# Patient Record
Sex: FEMALE | Race: WHITE | Employment: FULL TIME | URBAN - METROPOLITAN AREA
[De-identification: names, ages, dates, MRNs, and addresses within clinical notes are randomized per-mention and may not be internally consistent; named-entity substitution may affect disease eponyms.]

---

## 2017-01-19 ENCOUNTER — TELEPHONE (OUTPATIENT)
Dept: ORTHOPEDICS | Facility: CLINIC | Age: 57
End: 2017-01-19

## 2017-01-19 DIAGNOSIS — M75.42 IMPINGEMENT SYNDROME OF LEFT SHOULDER: Primary | ICD-10-CM

## 2017-01-19 DIAGNOSIS — M19.90 SENILE ARTHRITIS: ICD-10-CM

## 2017-01-19 NOTE — TELEPHONE ENCOUNTER
Scheduled surgery for Left shoulder scope, decompression, distal clavicle resection, poss rotator cuff repair on 2/14/2017 with Dr. Miller @ [facility] @ 3:30.  Surgery education packet provided to patient.

## 2017-01-19 NOTE — TELEPHONE ENCOUNTER
Panel Management Review      Patient has the following on her problem list:     Depression / Dysthymia review  PHQ-9 SCORE 3/26/2014 11/19/2014 3/28/2016   Total Score 3 5 -   Total Score - - 6      Patient is due for:  PHQ9    Diabetes    ASA: Passed    Last A1C  A1C      8.3   3/28/2016  A1C      7.5   9/2/2015  A1C      6.6   8/20/2013  A1C      8.8   6/25/2013  A1C tested: Failed    Last LDL:    CHOL      197   3/28/2016  HDL       39   3/28/2016  LDL       94   3/28/2016  TRIG      322   3/28/2016  CHOLHDLRATIO      5.4   6/25/2013  NHDL      158   3/28/2016    Is the patient on a Statin?NO           Is the patient on Aspirin? YES    Medications     Salicylates    aspirin 81 MG tablet          Last three blood pressure readings:  BP Readings from Last 3 Encounters:   09/28/16 142/80   09/26/16 138/80   09/12/16 120/80       Date of last diabetes office visit: 3/28/16     Tobacco History:     History   Smoking status     Former Smoker -- 0.50 packs/day     Types: Cigarettes     Quit date: 06/13/2013   Smokeless tobacco     Never Used     Comment: quit 6/13/13           Hypertension   Last three blood pressure readings:  BP Readings from Last 3 Encounters:   09/28/16 142/80   09/26/16 138/80   09/12/16 120/80     Blood pressure: Failed    HTN Guidelines:  Age 18-59 BP range:  Less than 140/90  Age 60-85 with Diabetes:  Less than 140/90  Age 60-85 without Diabetes:  less than 150/90      Composite cancer screening  Chart review shows that this patient is due/due soon for the following Mammogram and Fecal Colorectal (FIT)  Summary:    Patient is due/failing the following:   A1C, FIT and PHQ9    Action needed:   Patient needs referral/order: Mammo, FIT     Type of outreach:    Phone, spoke to patient.  [facility]    Questions for provider review:    None                                                                                                                                    Kendra Puckett  MALDONADO Estrada

## 2017-01-19 NOTE — TELEPHONE ENCOUNTER
Patient order pending for 2/14/2017  Left shoulder Arthroscopy, decompression, distal clavicle resection, poss rotator cuff tear.  Please sign.

## 2017-01-25 ENCOUNTER — OFFICE VISIT (OUTPATIENT)
Dept: FAMILY MEDICINE | Facility: CLINIC | Age: 57
End: 2017-01-25
Payer: COMMERCIAL

## 2017-01-25 VITALS
SYSTOLIC BLOOD PRESSURE: 154 MMHG | RESPIRATION RATE: 12 BRPM | BODY MASS INDEX: 43.65 KG/M2 | HEART RATE: 97 BPM | TEMPERATURE: 98 F | DIASTOLIC BLOOD PRESSURE: 80 MMHG | WEIGHT: 262.3 LBS

## 2017-01-25 DIAGNOSIS — Z11.59 NEED FOR HEPATITIS C SCREENING TEST: ICD-10-CM

## 2017-01-25 DIAGNOSIS — Z23 NEED FOR PROPHYLACTIC VACCINATION AGAINST STREPTOCOCCUS PNEUMONIAE (PNEUMOCOCCUS): ICD-10-CM

## 2017-01-25 DIAGNOSIS — Z01.818 PREOP GENERAL PHYSICAL EXAM: ICD-10-CM

## 2017-01-25 DIAGNOSIS — Z13.89 SCREENING FOR DIABETIC PERIPHERAL NEUROPATHY: ICD-10-CM

## 2017-01-25 DIAGNOSIS — Z12.11 SCREEN FOR COLON CANCER: Primary | ICD-10-CM

## 2017-01-25 DIAGNOSIS — Z23 NEED FOR PROPHYLACTIC VACCINATION AND INOCULATION AGAINST INFLUENZA: ICD-10-CM

## 2017-01-25 DIAGNOSIS — Z12.31 VISIT FOR SCREENING MAMMOGRAM: ICD-10-CM

## 2017-01-25 LAB
HBA1C MFR BLD: 6.2 % (ref 4.3–6)
HGB BLD-MCNC: 12.5 G/DL (ref 11.7–15.7)

## 2017-01-25 PROCEDURE — 99214 OFFICE O/P EST MOD 30 MIN: CPT | Performed by: FAMILY MEDICINE

## 2017-01-25 PROCEDURE — 86803 HEPATITIS C AB TEST: CPT | Performed by: FAMILY MEDICINE

## 2017-01-25 PROCEDURE — 85018 HEMOGLOBIN: CPT | Performed by: FAMILY MEDICINE

## 2017-01-25 PROCEDURE — 83036 HEMOGLOBIN GLYCOSYLATED A1C: CPT | Performed by: FAMILY MEDICINE

## 2017-01-25 PROCEDURE — 99207 C FOOT EXAM  NO CHARGE: CPT | Performed by: FAMILY MEDICINE

## 2017-01-25 PROCEDURE — 36415 COLL VENOUS BLD VENIPUNCTURE: CPT | Performed by: FAMILY MEDICINE

## 2017-01-25 PROCEDURE — 93000 ELECTROCARDIOGRAM COMPLETE: CPT | Performed by: FAMILY MEDICINE

## 2017-01-25 PROCEDURE — 80048 BASIC METABOLIC PNL TOTAL CA: CPT | Performed by: FAMILY MEDICINE

## 2017-01-25 NOTE — PROGRESS NOTES
[facility]  [address] [address]  [phone]  Dept: [phone]    PRE-OP EVALUATION:  Today's date: 2017    Areli Stubbs (: 1960) presents for pre-operative evaluation assessment as requested by Dr. Miller.  She requires evaluation and anesthesia risk assessment prior to undergoing surgery/procedure for treatment of rotator cuff  .  Proposed procedure: Arthroscopy    Date of Surgery/ Procedure: 17  Time of Surgery/ Procedure: 3:10pm  Hospital/Surgical Facility: [facility]    Primary Physician: Antoni Guerrero  Type of Anesthesia Anticipated: Choice    Patient has a Health Care Directive or Living Will:  NO    1. NO - Do you have a history of heart attack, stroke, stent, bypass or surgery on an artery in the head, neck, heart or legs?  2. NO - Do you ever have any pain or discomfort in your chest?  3. NO - Do you have a history of  Heart Failure?  4. NO - Are you troubled by shortness of breath when: walking on the level, up a slight hill or at night?  5. NO - Do you currently have a cold, bronchitis or other respiratory infection?  6. NO - Do you have a cough, shortness of breath or wheezing?  7. NO - Do you sometimes get pains in the calves of your legs when you walk?  8. NO - Do you or anyone in your family have previous history of blood clots?  9. NO - Do you or does anyone in your family have a serious bleeding problem such as prolonged bleeding following surgeries or cuts?  10. NO - Have you ever had problems with anemia or been told to take iron pills?  11. NO - Have you had any abnormal blood loss such as black, tarry or bloody stools, or abnormal vaginal bleeding?  12. NO - Have you ever had a blood transfusion?  13. NO - Have you or any of your relatives ever had problems with anesthesia?  14. NO - Do you have sleep apnea, excessive snoring or daytime drowsiness?  15. NO - Do you have any prosthetic heart valves?  16. YES - DO YOU  HAVE PROSTHETIC JOINTS? Right knee  17. NO - Is there any chance that you may be pregnant?      HPI:                                                      Brief HPI related to upcoming procedure: left shoulder pain       Past Medical History   Diagnosis Date     Esophageal reflux      Obesity, unspecified      Allergic rhinitis due to other allergen      Pain in joint, lower leg 12/10/2006     djd of the knee     Gastro-oesophageal reflux disease      Arthritis      Type 2 diabetes mellitus without complication (H) 3/28/2016     Insomnia, unspecified insomnia 3/28/2016     Hyperlipidemia LDL goal <160 3/28/2016     Diabetes type 2, uncontrolled (H) 3/31/2016     Posterior vitreous detachment 2014     Right eye       Past Surgical History   Procedure Laterality Date     C nonspecific procedure  1991     tubal ligation     Hysterectomy total abdominal, bilateral salpingo-oophorectomy, combined       benign     Dental surgery       Carpal tunnel release rt/lt       bilateral     Right hand surgery       tendon realease     Arthroscopy knee       left     Arthroplasty knee  6/4/2012     Procedure:ARTHROPLASTY KNEE; Right Total Knee Arthroplasty         Family History   Problem Relation Age of Onset     Breast Cancer Maternal Grandmother      CANCER Maternal Grandfather      Pancreatic     Breast Cancer Maternal Aunt      DIABETES Paternal Grandmother      Hypertension Father      Hypertension Paternal Grandmother      DIABETES Father        Social History   Substance Use Topics     Smoking status: Former Smoker -- 0.50 packs/day     Types: Cigarettes     Quit date: 06/13/2013     Smokeless tobacco: Never Used      Comment: quit 6/13/13     Alcohol Use: Yes      Comment: 1 monthly- rarely         MEDICAL HISTORY:                                                      Patient Active Problem List    Diagnosis Date Noted     Obesity, Class III, BMI 40-49.9 (morbid obesity) (H) 04/12/2016     Priority: Medium     Intertrigo  04/12/2016     Priority: Medium     Diabetes type 2, uncontrolled (H) 03/31/2016     Priority: Medium     Insomnia, unspecified insomnia 03/28/2016     Priority: Medium     Hyperlipidemia LDL goal <160 03/28/2016     Priority: Medium     5.8% 10 yr risk 2016         HTN, goal below 140/90 10/01/2013     Priority: Medium     Mild major depression (H) 08/28/2013     S/P TKR (total knee replacement): 6/4/12 06/08/2012     Anemia due to blood loss, acute 06/08/2012     Physical deconditioning 06/08/2012     Oral thrush 06/08/2012     Anxiety state 01/14/2004     Problem list name updated by automated process. Provider to review       Obesity 01/14/2004     Problem list name updated by automated process. Provider to review       Osteoarthrosis, unspecified whether generalized or localized, ankle and foot 01/14/2004      Past Medical History   Diagnosis Date     Esophageal reflux      Obesity, unspecified      Allergic rhinitis due to other allergen      Pain in joint, lower leg 12/10/2006     djd of the knee     Gastro-oesophageal reflux disease      Arthritis      Type 2 diabetes mellitus without complication (H) 3/28/2016     Insomnia, unspecified insomnia 3/28/2016     Hyperlipidemia LDL goal <160 3/28/2016     Diabetes type 2, uncontrolled (H) 3/31/2016     Posterior vitreous detachment 2014     Right eye     Past Surgical History   Procedure Laterality Date     C nonspecific procedure  1991     tubal ligation     Hysterectomy total abdominal, bilateral salpingo-oophorectomy, combined       benign     Dental surgery       Carpal tunnel release rt/lt       bilateral     Right hand surgery       tendon realease     Arthroscopy knee       left     Arthroplasty knee  6/4/2012     Procedure:ARTHROPLASTY KNEE; Right Total Knee Arthroplasty       Current Outpatient Prescriptions   Medication Sig Dispense Refill     HYDROcodone-acetaminophen (NORCO) 5-325 MG per tablet Take 1 tablet by mouth every 6 hours as needed 15 tablet  0     cyclobenzaprine (FLEXERIL) 10 MG tablet Take 1 tablet (10 mg) by mouth 3 times daily as needed for muscle spasms 30 tablet 1     methylPREDNISolone (MEDROL DOSEPAK) 4 MG tablet Follow package instructions 21 tablet 0     erythromycin (ROMYCIN) ophthalmic ointment Apply to incision sites three times a day 1 Tube 0     Calcium Carb-Cholecalciferol (CALCIUM 500 + D) 500-400 MG-UNIT TABS Take 1 tablet by mouth 3 times daily 270 tablet 0     cholecalciferol 2000 UNITS CAPS Take 1 capsule by mouth daily 90 capsule 0     Multiple Vitamins-Iron (QC DAILY MULTIVITAMINS/IRON) TABS Take 1 tablet by mouth daily 90 tablet 0     aspirin 81 MG tablet Take 1 tablet (81 mg) by mouth daily 100 tablet 3     metFORMIN (GLUCOPHAGE-XR) 500 MG 24 hr tablet Take 2 tablets (1,000 mg) by mouth daily (with dinner) 60 tablet 1     nabumetone (RELAFEN) 750 MG tablet Take 1 tablet (750 mg) by mouth daily 30 tablet 1     ibuprofen (ADVIL,MOTRIN) 600 MG tablet Take by mouth every 6 hours as needed       OTC products: None, except as noted above    Allergies   Allergen Reactions     Adhesive Tape      Sulfa Drugs       Latex Allergy: NO    Social History   Substance Use Topics     Smoking status: Former Smoker -- 0.50 packs/day     Types: Cigarettes     Quit date: 06/13/2013     Smokeless tobacco: Never Used      Comment: quit 6/13/13     Alcohol Use: Yes      Comment: 1 monthly- rarely     History   Drug Use No       REVIEW OF SYSTEMS:                                                    C: NEGATIVE for fever, chills, change in weight  I: NEGATIVE for worrisome rashes, moles or lesions  E: NEGATIVE for vision changes or irritation  E/M: NEGATIVE for ear, mouth and throat problems  R: NEGATIVE for significant cough or SOB  CV: NEGATIVE for chest pain, palpitations or peripheral edema  GI: NEGATIVE for nausea, abdominal pain, heartburn, or change in bowel habits  M: NEGATIVE for significant arthralgias or myalgia  N: NEGATIVE for weakness,  dizziness or paresthesias  E: NEGATIVE for temperature intolerance, skin/hair changes  H: NEGATIVE for bleeding problems  P: NEGATIVE for changes in mood or affect    EXAM:                                                    LMP 06/15/2006    GENERAL APPEARANCE: healthy, alert and no distress     EYES: EOMI, - PERRL     HENT: ear canals and TM's normal and nose and mouth without ulcers or lesions     NECK: no adenopathy, no asymmetry, masses, or scars and thyroid normal to palpation     RESP: lungs clear to auscultation - no rales, rhonchi or wheezes     CV: regular rates and rhythm, normal S1 S2, no S3 or S4 and no murmur, click or rub -     ABDOMEN:  soft, nontender, no HSM or masses and bowel sounds normal     MS: extremities normal- no gross deformities noted, no evidence of inflammation in joints, FROM in all extremities.     SKIN: no suspicious lesions or rashes     NEURO: Normal strength and tone, sensory exam grossly normal, mentation intact and speech normal     PSYCH: mentation appears normal. and affect normal/bright     LYMPHATICS: No axillary, cervical, inguinal, or supraclavicular nodes    DIAGNOSTICS:                                                    EKG: appears normal, NSR, normal axis, normal intervals, no acute ST/T changes c/w ischemia, no LVH by voltage criteria, unchanged from previous tracings    Recent Labs   Lab Test  03/28/16   1812 09/02/15   06/25/13   0813  06/06/12   0625   06/05/12   0639   HGB   --    --    --    --   10.4*   --   10.9*   NA  136   --    --   140   --    --    --    POTASSIUM  4.4   --    --   3.2*   --    --    --    CR  1.03   --    --   0.97  1.07*   < >   --    A1C  8.3*  7.5*   < >  8.8*   --    --    --     < > = values in this interval not displayed.        IMPRESSION:                                                    Reason for surgery/procedure: shoulder pain  Diagnosis/reason for consult: preop evalutation    The proposed surgical procedure is considered  LOW risk.    REVISED CARDIAC RISK INDEX  The patient has the following serious cardiovascular risks for perioperative complications such as (MI, PE, VFib and 3  AV Block):  No serious cardiac risks  INTERPRETATION: 1 risks: Class II (low risk - 0.9% complication rate)    The patient has the following additional risks for perioperative complications:  No identified additional risks        RECOMMENDATIONS:                                                          01.818) Preop general physical exam  Comment: needs Health maintenance checkup  Plan: HEMOGLOBIN A1C, EKG 12-lead complete w/read -         Clinics, Basic metabolic panel  (Ca, Cl, CO2,         Creat, Gluc, K, Na, BUN), Hemoglobin          ekg normal, fit for surgery         --Patient is to take all scheduled medications on the day of surgery EXCEPT for modifications listed below.    APPROVAL GIVEN to proceed with proposed procedure, without further diagnostic evaluation       Signed Electronically by: Ruben Reich MD    Copy of this evaluation report is provided to requesting physician.    [place] Preop Guidelines

## 2017-01-25 NOTE — NURSING NOTE
"Chief Complaint   Patient presents with     Pre-Op Exam       Initial /80 mmHg  Pulse 97  Temp(Src) 98  F (36.7  C) (Oral)  Resp 12  Wt 262 lb 4.8 oz (118.978 kg)  LMP 06/15/2006 Estimated body mass index is 43.65 kg/(m^2) as calculated from the following:    Height as of 9/28/16: 5' 5\" (1.651 m).    Weight as of this encounter: 262 lb 4.8 oz (118.978 kg).  BP completed using cuff size: jacqueline Vanegas CMA       "

## 2017-01-26 LAB
ANION GAP SERPL CALCULATED.3IONS-SCNC: 9 MMOL/L (ref 3–14)
BUN SERPL-MCNC: 16 MG/DL (ref 7–30)
CALCIUM SERPL-MCNC: 9.4 MG/DL (ref 8.5–10.1)
CHLORIDE SERPL-SCNC: 105 MMOL/L (ref 94–109)
CO2 SERPL-SCNC: 28 MMOL/L (ref 20–32)
CREAT SERPL-MCNC: 0.99 MG/DL (ref 0.52–1.04)
GFR SERPL CREATININE-BSD FRML MDRD: 58 ML/MIN/1.7M2
GLUCOSE SERPL-MCNC: 116 MG/DL (ref 70–99)
HCV AB SERPL QL IA: NORMAL
POTASSIUM SERPL-SCNC: 4.4 MMOL/L (ref 3.4–5.3)
SODIUM SERPL-SCNC: 142 MMOL/L (ref 133–144)

## 2017-01-26 ASSESSMENT — PATIENT HEALTH QUESTIONNAIRE - PHQ9: SUM OF ALL RESPONSES TO PHQ QUESTIONS 1-9: 4

## 2017-02-01 ENCOUNTER — THERAPY VISIT (OUTPATIENT)
Dept: PHYSICAL THERAPY | Facility: CLINIC | Age: 57
End: 2017-02-01
Payer: COMMERCIAL

## 2017-02-01 DIAGNOSIS — S43.422A SPRAIN OF LEFT ROTATOR CUFF CAPSULE, INITIAL ENCOUNTER: Primary | ICD-10-CM

## 2017-02-01 PROCEDURE — 97530 THERAPEUTIC ACTIVITIES: CPT | Mod: GP | Performed by: PHYSICAL THERAPIST

## 2017-02-01 PROCEDURE — 97110 THERAPEUTIC EXERCISES: CPT | Mod: GP | Performed by: PHYSICAL THERAPIST

## 2017-02-01 PROCEDURE — 97161 PT EVAL LOW COMPLEX 20 MIN: CPT | Mod: GP | Performed by: PHYSICAL THERAPIST

## 2017-02-01 PROCEDURE — 97010 HOT OR COLD PACKS THERAPY: CPT | Mod: GP | Performed by: PHYSICAL THERAPIST

## 2017-02-01 NOTE — PROGRESS NOTES
Subjective:    Areli Stubbs is a 56 year old female with a left shoulder condition.  Condition occurred with:  Unknown cause.  Condition occurred: for unknown reasons.  This is a new condition  Pt c/o L shoulder pain since 9/2016.  Denies injury.  Pt is R handed.  Pt is scheduled to have surgery 2/14/17 (decompression vs RCR)..    Patient reports pain:  Anterior, posterior, medial and lateral.  Radiates to:  Upper arm.  Pain is described as sharp, shooting and aching  and reported as 7/10 and 9/10.  Associated symptoms:  Loss of motion/stiffness and loss of strength. Pain is the same all the time.  Symptoms are exacerbated by using arm overhead, using arm behind back, using arm at shoulder level, lifting, carrying and lying on extremity and relieved by rest.  Since onset symptoms are unchanged.        General health as reported by patient is fair.  Pertinent medical history includes:  High blood pressure, overweight, diabetes, osteoarthritis and smoking (HBP in past and stopped smoking 2013).  Medical allergies: yes (sulfa and tape).  Other surgeries include:  Orthopedic surgery and other (hysterectomy and R TKA and L knee scope).  Current medications:  Other (metformin er).  Current occupation is Facilities assigner  .    Primary job tasks include:  Prolonged sitting and other (computer).                              Objective:    System                   Shoulder Evaluation:  ROM:  AROM:    Flexion:  Left:  90        Abduction:  Left: 85         External Rotation:  Left:  80                Extension/Internal Rotation:  Left:  Outer hip        PROM:    Flexion:  Left:  125        Extension:  Left:  45      Abduction:  Left:  110          External Rotation:  Left:  ERP                        Strength:    Flexion: Left:4-/5    Pain: ++    Right: /5 strong/pain free    Pain:   Extension:  Left: 4+/5     Pain:-/+    Right: /5  Strong/pain free  Pain:  Abduction:  Left: 4-/5   Pain:++    Right:/5  Strong/pain free     Pain:  Adduction:  Left: 4+/5     Pain:-/+    Right:/5  Strong/pain free    Pain:  Internal Rotation:  Left:4/5      Pain:+    Right:/5  Strong/pain free    Pain:  External Rotation:   Left:4-/5      Pain:+   Right:/5  Strong/pain free    Pain:                Special Tests:    Left shoulder positive for the following special tests:  Impingement and Rotator cuff tear    Palpation:    Left shoulder tenderness present at:  Biceps; Supraspinatus; Infraspinatus; Levator and Upper Trap    Mobility Tests:      Glenohumeral posterior left:  Hypomobile                                                 General     ROS    Assessment/Plan:      Patient is a 56 year old female with left side shoulder complaints.    Patient has the following significant findings with corresponding treatment plan.                Diagnosis 1:  Pre-op L shoulder possible decompression/RCR  Pain -  hot/cold therapy, directional preference exercise and home program  Decreased ROM/flexibility - therapeutic exercise and home program  Decreased strength - therapeutic exercise, therapeutic activities and home program  Impaired muscle performance - neuro re-education and home program  Decreased function - therapeutic activities and home program  Impaired posture - neuro re-education and home program    Therapy Evaluation Codes:   1) History comprised of:   Personal factors that impact the plan of care:      Time since onset of symptoms.    Comorbidity factors that impact the plan of care are:      Diabetes, High blood pressure, Osteoarthritis, Overweight and Smoking.     Medications impacting care: Anti-inflammatory and Pain.  2) Examination of Body Systems comprised of:   Body structures and functions that impact the plan of care:      Shoulder.   Activity limitations that impact the plan of care are:      Bathing, Dressing, Lifting, Working, Sleeping and Laying down.  3) Clinical presentation characteristics  are:   Stable/Uncomplicated.  4) Decision-Making    Low complexity using standardized patient assessment instrument and/or measureable assessment of functional outcome.  Cumulative Therapy Evaluation is: Low complexity.    Previous and current functional limitations:  (See Goal Flow Sheet for this information)    Short term and Long term goals:Pt independent with established HEP at IE.  Goal met    Communication ability:  Patient appears to be able to clearly communicate and understand verbal and written communication and follow directions correctly.  Treatment Explanation - The following has been discussed with the patient:   RX ordered/plan of care  Anticipated outcomes  Possible risks and side effects  This patient would benefit from PT intervention to resume normal activities.   Rehab potential is excellent.    Frequency:  1 X week, once daily  Duration:  for 1 visits  Discharge Plan:  Achieve all LTG.  Independent in home treatment program.  Reach maximal therapeutic benefit.    Please refer to the daily flowsheet for treatment today, total treatment time and time spent performing 1:1 timed codes.

## 2017-02-03 ENCOUNTER — TELEPHONE (OUTPATIENT)
Dept: ORTHOPEDICS | Facility: CLINIC | Age: 57
End: 2017-02-03

## 2017-02-03 NOTE — LETTER
[facility]  [address]  [place] [address]  [phone]        February 13, 2017    RE:Areli Stubbs  [address] [address]    1960            Dear Ms. Stubbs      To whom it may concern:    Areli Stubbs is scheduled for left shoulder arthroscopy, decompression, distal clavicle resection, and possible rotator cuff tear repair on 2/14/16. She will be off work for approximately 2 weeks or longer depending on what is done in surgery, treatment, and recovery needed.     Sincerely,      Obdulio Loco PA-C

## 2017-02-03 NOTE — TELEPHONE ENCOUNTER
Our goal is to complete and return forms within 5-7 business days of receiving the request.    Type of form Requested: Attending Physician Statement  Form requested by (include company):   [employer]    Phone number for requestor: [phone]  Date form is requested by: not listed    How will form be returned?:  fax to [phone]  Has the patient signed a consent form for release of information (may be included with form)? No  Additional comments: pt is scheduled for L should scope, dec/dcr and possible RC repair - 2/14/2017    Form was started and place in brown accordion file for provider review/ completion at [facility].

## 2017-02-13 NOTE — H&P (VIEW-ONLY)
[facility]  [address] [facility] [address]  [phone]  Dept: [phone]    PRE-OP EVALUATION:  Today's date: 2017    Areli Stubbs (: 1960) presents for pre-operative evaluation assessment as requested by Dr. Miller.  She requires evaluation and anesthesia risk assessment prior to undergoing surgery/procedure for treatment of rotator cuff  .  Proposed procedure: Arthroscopy    Date of Surgery/ Procedure: 17  Time of Surgery/ Procedure: 3:10pm  Hospital/Surgical Facility: [facility]    Primary Physician: Antoni Guerrero  Type of Anesthesia Anticipated: Choice    Patient has a Health Care Directive or Living Will:  NO    1. NO - Do you have a history of heart attack, stroke, stent, bypass or surgery on an artery in the head, neck, heart or legs?  2. NO - Do you ever have any pain or discomfort in your chest?  3. NO - Do you have a history of  Heart Failure?  4. NO - Are you troubled by shortness of breath when: walking on the level, up a slight hill or at night?  5. NO - Do you currently have a cold, bronchitis or other respiratory infection?  6. NO - Do you have a cough, shortness of breath or wheezing?  7. NO - Do you sometimes get pains in the calves of your legs when you walk?  8. NO - Do you or anyone in your family have previous history of blood clots?  9. NO - Do you or does anyone in your family have a serious bleeding problem such as prolonged bleeding following surgeries or cuts?  10. NO - Have you ever had problems with anemia or been told to take iron pills?  11. NO - Have you had any abnormal blood loss such as black, tarry or bloody stools, or abnormal vaginal bleeding?  12. NO - Have you ever had a blood transfusion?  13. NO - Have you or any of your relatives ever had problems with anesthesia?  14. NO - Do you have sleep apnea, excessive snoring or daytime drowsiness?  15. NO - Do you have any prosthetic heart valves?  16. YES - DO YOU  HAVE PROSTHETIC JOINTS? Right knee  17. NO - Is there any chance that you may be pregnant?      HPI:                                                      Brief HPI related to upcoming procedure: left shoulder pain       Past Medical History   Diagnosis Date     Esophageal reflux      Obesity, unspecified      Allergic rhinitis due to other allergen      Pain in joint, lower leg 12/10/2006     djd of the knee     Gastro-oesophageal reflux disease      Arthritis      Type 2 diabetes mellitus without complication (H) 3/28/2016     Insomnia, unspecified insomnia 3/28/2016     Hyperlipidemia LDL goal <160 3/28/2016     Diabetes type 2, uncontrolled (H) 3/31/2016     Posterior vitreous detachment 2014     Right eye       Past Surgical History   Procedure Laterality Date     C nonspecific procedure  1991     tubal ligation     Hysterectomy total abdominal, bilateral salpingo-oophorectomy, combined       benign     Dental surgery       Carpal tunnel release rt/lt       bilateral     Right hand surgery       tendon realease     Arthroscopy knee       left     Arthroplasty knee  6/4/2012     Procedure:ARTHROPLASTY KNEE; Right Total Knee Arthroplasty         Family History   Problem Relation Age of Onset     Breast Cancer Maternal Grandmother      CANCER Maternal Grandfather      Pancreatic     Breast Cancer Maternal Aunt      DIABETES Paternal Grandmother      Hypertension Father      Hypertension Paternal Grandmother      DIABETES Father        Social History   Substance Use Topics     Smoking status: Former Smoker -- 0.50 packs/day     Types: Cigarettes     Quit date: 06/13/2013     Smokeless tobacco: Never Used      Comment: quit 6/13/13     Alcohol Use: Yes      Comment: 1 monthly- rarely         MEDICAL HISTORY:                                                      Patient Active Problem List    Diagnosis Date Noted     Obesity, Class III, BMI 40-49.9 (morbid obesity) (H) 04/12/2016     Priority: Medium     Intertrigo  04/12/2016     Priority: Medium     Diabetes type 2, uncontrolled (H) 03/31/2016     Priority: Medium     Insomnia, unspecified insomnia 03/28/2016     Priority: Medium     Hyperlipidemia LDL goal <160 03/28/2016     Priority: Medium     5.8% 10 yr risk 2016         HTN, goal below 140/90 10/01/2013     Priority: Medium     Mild major depression (H) 08/28/2013     S/P TKR (total knee replacement): 6/4/12 06/08/2012     Anemia due to blood loss, acute 06/08/2012     Physical deconditioning 06/08/2012     Oral thrush 06/08/2012     Anxiety state 01/14/2004     Problem list name updated by automated process. Provider to review       Obesity 01/14/2004     Problem list name updated by automated process. Provider to review       Osteoarthrosis, unspecified whether generalized or localized, ankle and foot 01/14/2004      Past Medical History   Diagnosis Date     Esophageal reflux      Obesity, unspecified      Allergic rhinitis due to other allergen      Pain in joint, lower leg 12/10/2006     djd of the knee     Gastro-oesophageal reflux disease      Arthritis      Type 2 diabetes mellitus without complication (H) 3/28/2016     Insomnia, unspecified insomnia 3/28/2016     Hyperlipidemia LDL goal <160 3/28/2016     Diabetes type 2, uncontrolled (H) 3/31/2016     Posterior vitreous detachment 2014     Right eye     Past Surgical History   Procedure Laterality Date     C nonspecific procedure  1991     tubal ligation     Hysterectomy total abdominal, bilateral salpingo-oophorectomy, combined       benign     Dental surgery       Carpal tunnel release rt/lt       bilateral     Right hand surgery       tendon realease     Arthroscopy knee       left     Arthroplasty knee  6/4/2012     Procedure:ARTHROPLASTY KNEE; Right Total Knee Arthroplasty       Current Outpatient Prescriptions   Medication Sig Dispense Refill     HYDROcodone-acetaminophen (NORCO) 5-325 MG per tablet Take 1 tablet by mouth every 6 hours as needed 15 tablet  0     cyclobenzaprine (FLEXERIL) 10 MG tablet Take 1 tablet (10 mg) by mouth 3 times daily as needed for muscle spasms 30 tablet 1     methylPREDNISolone (MEDROL DOSEPAK) 4 MG tablet Follow package instructions 21 tablet 0     erythromycin (ROMYCIN) ophthalmic ointment Apply to incision sites three times a day 1 Tube 0     Calcium Carb-Cholecalciferol (CALCIUM 500 + D) 500-400 MG-UNIT TABS Take 1 tablet by mouth 3 times daily 270 tablet 0     cholecalciferol 2000 UNITS CAPS Take 1 capsule by mouth daily 90 capsule 0     Multiple Vitamins-Iron (QC DAILY MULTIVITAMINS/IRON) TABS Take 1 tablet by mouth daily 90 tablet 0     aspirin 81 MG tablet Take 1 tablet (81 mg) by mouth daily 100 tablet 3     metFORMIN (GLUCOPHAGE-XR) 500 MG 24 hr tablet Take 2 tablets (1,000 mg) by mouth daily (with dinner) 60 tablet 1     nabumetone (RELAFEN) 750 MG tablet Take 1 tablet (750 mg) by mouth daily 30 tablet 1     ibuprofen (ADVIL,MOTRIN) 600 MG tablet Take by mouth every 6 hours as needed       OTC products: None, except as noted above    Allergies   Allergen Reactions     Adhesive Tape      Sulfa Drugs       Latex Allergy: NO    Social History   Substance Use Topics     Smoking status: Former Smoker -- 0.50 packs/day     Types: Cigarettes     Quit date: 06/13/2013     Smokeless tobacco: Never Used      Comment: quit 6/13/13     Alcohol Use: Yes      Comment: 1 monthly- rarely     History   Drug Use No       REVIEW OF SYSTEMS:                                                    C: NEGATIVE for fever, chills, change in weight  I: NEGATIVE for worrisome rashes, moles or lesions  E: NEGATIVE for vision changes or irritation  E/M: NEGATIVE for ear, mouth and throat problems  R: NEGATIVE for significant cough or SOB  CV: NEGATIVE for chest pain, palpitations or peripheral edema  GI: NEGATIVE for nausea, abdominal pain, heartburn, or change in bowel habits  M: NEGATIVE for significant arthralgias or myalgia  N: NEGATIVE for weakness,  dizziness or paresthesias  E: NEGATIVE for temperature intolerance, skin/hair changes  H: NEGATIVE for bleeding problems  P: NEGATIVE for changes in mood or affect    EXAM:                                                    LMP 06/15/2006    GENERAL APPEARANCE: healthy, alert and no distress     EYES: EOMI, - PERRL     HENT: ear canals and TM's normal and nose and mouth without ulcers or lesions     NECK: no adenopathy, no asymmetry, masses, or scars and thyroid normal to palpation     RESP: lungs clear to auscultation - no rales, rhonchi or wheezes     CV: regular rates and rhythm, normal S1 S2, no S3 or S4 and no murmur, click or rub -     ABDOMEN:  soft, nontender, no HSM or masses and bowel sounds normal     MS: extremities normal- no gross deformities noted, no evidence of inflammation in joints, FROM in all extremities.     SKIN: no suspicious lesions or rashes     NEURO: Normal strength and tone, sensory exam grossly normal, mentation intact and speech normal     PSYCH: mentation appears normal. and affect normal/bright     LYMPHATICS: No axillary, cervical, inguinal, or supraclavicular nodes    DIAGNOSTICS:                                                    EKG: appears normal, NSR, normal axis, normal intervals, no acute ST/T changes c/w ischemia, no LVH by voltage criteria, unchanged from previous tracings    Recent Labs   Lab Test  03/28/16   1812 09/02/15   06/25/13   0813  06/06/12   0625   06/05/12   0639   HGB   --    --    --    --   10.4*   --   10.9*   NA  136   --    --   140   --    --    --    POTASSIUM  4.4   --    --   3.2*   --    --    --    CR  1.03   --    --   0.97  1.07*   < >   --    A1C  8.3*  7.5*   < >  8.8*   --    --    --     < > = values in this interval not displayed.        IMPRESSION:                                                    Reason for surgery/procedure: shoulder pain  Diagnosis/reason for consult: preop evalutation    The proposed surgical procedure is considered  LOW risk.    REVISED CARDIAC RISK INDEX  The patient has the following serious cardiovascular risks for perioperative complications such as (MI, PE, VFib and 3  AV Block):  No serious cardiac risks  INTERPRETATION: 1 risks: Class II (low risk - 0.9% complication rate)    The patient has the following additional risks for perioperative complications:  No identified additional risks        RECOMMENDATIONS:                                                          01.818) Preop general physical exam  Comment: needs Health maintenance checkup  Plan: HEMOGLOBIN A1C, EKG 12-lead complete w/read -         Clinics, Basic metabolic panel  (Ca, Cl, CO2,         Creat, Gluc, K, Na, BUN), Hemoglobin          ekg normal, fit for surgery         --Patient is to take all scheduled medications on the day of surgery EXCEPT for modifications listed below.    APPROVAL GIVEN to proceed with proposed procedure, without further diagnostic evaluation       Signed Electronically by: Ruben Reich MD    Copy of this evaluation report is provided to requesting physician.    [place] Preop Guidelines

## 2017-02-13 NOTE — TELEPHONE ENCOUNTER
Patient left voicemail checking status of forms stating [employer] has not received yet.     Phone call to patient. Informed disability forms are not completed until after surgery has been done. We can send letter to disability company with date of scheduled surgery and approximate time off. Patient is unsure of how much time she will need off as it is unsure if she will have rotator cuff repaired or not. Discussed to have her off 2 weeks initially and then see what is done in surgery. Informed will fax letter to [employer].     Consulted with Obdulio Loco PA-C /Plan :ok for 2 weeks off initially.     Letter faxed to [employer] at [phone].    JUSTO Gaming RN

## 2017-02-14 ENCOUNTER — ANESTHESIA EVENT (OUTPATIENT)
Dept: SURGERY | Facility: CLINIC | Age: 57
End: 2017-02-14
Payer: COMMERCIAL

## 2017-02-14 ENCOUNTER — ANESTHESIA (OUTPATIENT)
Dept: SURGERY | Facility: CLINIC | Age: 57
End: 2017-02-14
Payer: COMMERCIAL

## 2017-02-14 ENCOUNTER — HOSPITAL ENCOUNTER (OUTPATIENT)
Facility: CLINIC | Age: 57
Discharge: HOME | End: 2017-02-14
Attending: ORTHOPAEDIC SURGERY | Admitting: ORTHOPAEDIC SURGERY
Payer: COMMERCIAL

## 2017-02-14 VITALS
RESPIRATION RATE: 16 BRPM | HEART RATE: 94 BPM | OXYGEN SATURATION: 94 % | TEMPERATURE: 98.2 F | DIASTOLIC BLOOD PRESSURE: 84 MMHG | BODY MASS INDEX: 43.27 KG/M2 | SYSTOLIC BLOOD PRESSURE: 150 MMHG | WEIGHT: 260 LBS

## 2017-02-14 DIAGNOSIS — G89.18 POST-OP PAIN: Primary | ICD-10-CM

## 2017-02-14 LAB
GLUCOSE BLDC GLUCOMTR-MCNC: 115 MG/DL (ref 70–99)
GLUCOSE BLDC GLUCOMTR-MCNC: 157 MG/DL (ref 70–99)

## 2017-02-14 PROCEDURE — C1713 ANCHOR/SCREW BN/BN,TIS/BN: HCPCS | Performed by: ORTHOPAEDIC SURGERY

## 2017-02-14 PROCEDURE — 25000125 ZZHC RX 250: Performed by: ANESTHESIOLOGY

## 2017-02-14 PROCEDURE — 71000013 ZZH RECOVERY PHASE 1 LEVEL 1 EA ADDTL HR: Performed by: ORTHOPAEDIC SURGERY

## 2017-02-14 PROCEDURE — 29824 SHO ARTHRS SRG DSTL CLAVICLC: CPT | Mod: 59 | Performed by: ORTHOPAEDIC SURGERY

## 2017-02-14 PROCEDURE — 71000012 ZZH RECOVERY PHASE 1 LEVEL 1 FIRST HR: Performed by: ORTHOPAEDIC SURGERY

## 2017-02-14 PROCEDURE — 40000171 ZZH STATISTIC PRE-PROCEDURE ASSESSMENT III: Performed by: ORTHOPAEDIC SURGERY

## 2017-02-14 PROCEDURE — 25000125 ZZHC RX 250: Performed by: NURSE ANESTHETIST, CERTIFIED REGISTERED

## 2017-02-14 PROCEDURE — 36000093 ZZH SURGERY LEVEL 4 1ST 30 MIN: Performed by: ORTHOPAEDIC SURGERY

## 2017-02-14 PROCEDURE — 25000125 ZZHC RX 250: Performed by: ORTHOPAEDIC SURGERY

## 2017-02-14 PROCEDURE — 29824 SHO ARTHRS SRG DSTL CLAVICLC: CPT | Mod: AS | Performed by: PHYSICIAN ASSISTANT

## 2017-02-14 PROCEDURE — 25000566 ZZH SEVOFLURANE, EA 15 MIN: Performed by: ORTHOPAEDIC SURGERY

## 2017-02-14 PROCEDURE — 25000132 ZZH RX MED GY IP 250 OP 250 PS 637: Performed by: ORTHOPAEDIC SURGERY

## 2017-02-14 PROCEDURE — 25000128 H RX IP 250 OP 636: Performed by: NURSE ANESTHETIST, CERTIFIED REGISTERED

## 2017-02-14 PROCEDURE — 23412 REPAIR ROTATOR CUFF CHRONIC: CPT | Mod: LT | Performed by: ORTHOPAEDIC SURGERY

## 2017-02-14 PROCEDURE — 71000027 ZZH RECOVERY PHASE 2 EACH 15 MINS: Performed by: ORTHOPAEDIC SURGERY

## 2017-02-14 PROCEDURE — 27210794 ZZH OR GENERAL SUPPLY STERILE: Performed by: ORTHOPAEDIC SURGERY

## 2017-02-14 PROCEDURE — 25000128 H RX IP 250 OP 636: Performed by: ORTHOPAEDIC SURGERY

## 2017-02-14 PROCEDURE — 25800025 ZZH RX 258: Performed by: ORTHOPAEDIC SURGERY

## 2017-02-14 PROCEDURE — 36000063 ZZH SURGERY LEVEL 4 EA 15 ADDTL MIN: Performed by: ORTHOPAEDIC SURGERY

## 2017-02-14 PROCEDURE — 25000128 H RX IP 250 OP 636: Performed by: ANESTHESIOLOGY

## 2017-02-14 PROCEDURE — 27211024 ZZHC OR SUPPLY OTHER OPNP: Performed by: ORTHOPAEDIC SURGERY

## 2017-02-14 PROCEDURE — 23412 REPAIR ROTATOR CUFF CHRONIC: CPT | Mod: AS | Performed by: PHYSICIAN ASSISTANT

## 2017-02-14 PROCEDURE — 37000009 ZZH ANESTHESIA TECHNICAL FEE, EACH ADDTL 15 MIN: Performed by: ORTHOPAEDIC SURGERY

## 2017-02-14 PROCEDURE — 29826 SHO ARTHRS SRG DECOMPRESSION: CPT | Mod: 59 | Performed by: ORTHOPAEDIC SURGERY

## 2017-02-14 PROCEDURE — 27210995 ZZH RX 272: Performed by: ORTHOPAEDIC SURGERY

## 2017-02-14 PROCEDURE — 37000008 ZZH ANESTHESIA TECHNICAL FEE, 1ST 30 MIN: Performed by: ORTHOPAEDIC SURGERY

## 2017-02-14 PROCEDURE — 25800025 ZZH RX 258: Performed by: ANESTHESIOLOGY

## 2017-02-14 PROCEDURE — 82962 GLUCOSE BLOOD TEST: CPT

## 2017-02-14 PROCEDURE — 29826 SHO ARTHRS SRG DECOMPRESSION: CPT | Mod: AS | Performed by: PHYSICIAN ASSISTANT

## 2017-02-14 DEVICE — IMPLANTABLE DEVICE: Type: IMPLANTABLE DEVICE | Site: SHOULDER | Status: FUNCTIONAL

## 2017-02-14 RX ORDER — LABETALOL HYDROCHLORIDE 5 MG/ML
INJECTION, SOLUTION INTRAVENOUS PRN
Status: DISCONTINUED | OUTPATIENT
Start: 2017-02-14 | End: 2017-02-14

## 2017-02-14 RX ORDER — ACETAMINOPHEN 10 MG/ML
1000 INJECTION, SOLUTION INTRAVENOUS ONCE
Status: COMPLETED | OUTPATIENT
Start: 2017-02-14 | End: 2017-02-14

## 2017-02-14 RX ORDER — PROMETHAZINE HYDROCHLORIDE 25 MG/ML
12.5 INJECTION, SOLUTION INTRAMUSCULAR; INTRAVENOUS
Status: DISCONTINUED | OUTPATIENT
Start: 2017-02-14 | End: 2017-02-14 | Stop reason: HOSPADM

## 2017-02-14 RX ORDER — CEFAZOLIN SODIUM 1 G/3ML
1 INJECTION, POWDER, FOR SOLUTION INTRAMUSCULAR; INTRAVENOUS SEE ADMIN INSTRUCTIONS
Status: DISCONTINUED | OUTPATIENT
Start: 2017-02-14 | End: 2017-02-14 | Stop reason: HOSPADM

## 2017-02-14 RX ORDER — ONDANSETRON 4 MG/1
4 TABLET, ORALLY DISINTEGRATING ORAL EVERY 30 MIN PRN
Status: COMPLETED | OUTPATIENT
Start: 2017-02-14 | End: 2017-02-14

## 2017-02-14 RX ORDER — DEXAMETHASONE SODIUM PHOSPHATE 4 MG/ML
INJECTION, SOLUTION INTRA-ARTICULAR; INTRALESIONAL; INTRAMUSCULAR; INTRAVENOUS; SOFT TISSUE PRN
Status: DISCONTINUED | OUTPATIENT
Start: 2017-02-14 | End: 2017-02-14

## 2017-02-14 RX ORDER — OXYCODONE AND ACETAMINOPHEN 5; 325 MG/1; MG/1
1-2 TABLET ORAL
Status: COMPLETED | OUTPATIENT
Start: 2017-02-14 | End: 2017-02-14

## 2017-02-14 RX ORDER — FENTANYL CITRATE 50 UG/ML
25-50 INJECTION, SOLUTION INTRAMUSCULAR; INTRAVENOUS
Status: DISCONTINUED | OUTPATIENT
Start: 2017-02-14 | End: 2017-02-14 | Stop reason: HOSPADM

## 2017-02-14 RX ORDER — NALOXONE HYDROCHLORIDE 0.4 MG/ML
.1-.4 INJECTION, SOLUTION INTRAMUSCULAR; INTRAVENOUS; SUBCUTANEOUS
Status: DISCONTINUED | OUTPATIENT
Start: 2017-02-14 | End: 2017-02-14 | Stop reason: HOSPADM

## 2017-02-14 RX ORDER — CEFAZOLIN SODIUM 1 G/3ML
INJECTION, POWDER, FOR SOLUTION INTRAMUSCULAR; INTRAVENOUS PRN
Status: DISCONTINUED | OUTPATIENT
Start: 2017-02-14 | End: 2017-02-14

## 2017-02-14 RX ORDER — LABETALOL HYDROCHLORIDE 5 MG/ML
10 INJECTION, SOLUTION INTRAVENOUS
Status: DISCONTINUED | OUTPATIENT
Start: 2017-02-14 | End: 2017-02-14 | Stop reason: HOSPADM

## 2017-02-14 RX ORDER — METOCLOPRAMIDE HYDROCHLORIDE 5 MG/ML
10 INJECTION INTRAMUSCULAR; INTRAVENOUS EVERY 6 HOURS PRN
Status: DISCONTINUED | OUTPATIENT
Start: 2017-02-14 | End: 2017-02-14 | Stop reason: HOSPADM

## 2017-02-14 RX ORDER — MEPERIDINE HYDROCHLORIDE 25 MG/ML
12.5 INJECTION INTRAMUSCULAR; INTRAVENOUS; SUBCUTANEOUS
Status: DISCONTINUED | OUTPATIENT
Start: 2017-02-14 | End: 2017-02-14 | Stop reason: HOSPADM

## 2017-02-14 RX ORDER — SODIUM CHLORIDE, SODIUM LACTATE, POTASSIUM CHLORIDE, CALCIUM CHLORIDE 600; 310; 30; 20 MG/100ML; MG/100ML; MG/100ML; MG/100ML
INJECTION, SOLUTION INTRAVENOUS CONTINUOUS
Status: DISCONTINUED | OUTPATIENT
Start: 2017-02-14 | End: 2017-02-14 | Stop reason: HOSPADM

## 2017-02-14 RX ORDER — LIDOCAINE 40 MG/G
CREAM TOPICAL
Status: DISCONTINUED | OUTPATIENT
Start: 2017-02-14 | End: 2017-02-14 | Stop reason: HOSPADM

## 2017-02-14 RX ORDER — KETOROLAC TROMETHAMINE 30 MG/ML
30 INJECTION, SOLUTION INTRAMUSCULAR; INTRAVENOUS EVERY 6 HOURS PRN
Status: DISCONTINUED | OUTPATIENT
Start: 2017-02-14 | End: 2017-02-14 | Stop reason: HOSPADM

## 2017-02-14 RX ORDER — CEFAZOLIN SODIUM 2 G/100ML
2 INJECTION, SOLUTION INTRAVENOUS
Status: COMPLETED | OUTPATIENT
Start: 2017-02-14 | End: 2017-02-14

## 2017-02-14 RX ORDER — ACETAMINOPHEN 325 MG/1
975 TABLET ORAL ONCE
Status: COMPLETED | OUTPATIENT
Start: 2017-02-14 | End: 2017-02-14

## 2017-02-14 RX ORDER — LIDOCAINE HYDROCHLORIDE AND EPINEPHRINE 10; 10 MG/ML; UG/ML
INJECTION, SOLUTION INFILTRATION; PERINEURAL PRN
Status: DISCONTINUED | OUTPATIENT
Start: 2017-02-14 | End: 2017-02-14 | Stop reason: HOSPADM

## 2017-02-14 RX ORDER — HYDRALAZINE HYDROCHLORIDE 20 MG/ML
2.5-5 INJECTION INTRAMUSCULAR; INTRAVENOUS EVERY 10 MIN PRN
Status: DISCONTINUED | OUTPATIENT
Start: 2017-02-14 | End: 2017-02-14 | Stop reason: HOSPADM

## 2017-02-14 RX ORDER — CELECOXIB 200 MG/1
400 CAPSULE ORAL
Status: DISCONTINUED | OUTPATIENT
Start: 2017-02-14 | End: 2017-02-14

## 2017-02-14 RX ORDER — DEXAMETHASONE SODIUM PHOSPHATE 4 MG/ML
4 INJECTION, SOLUTION INTRA-ARTICULAR; INTRALESIONAL; INTRAMUSCULAR; INTRAVENOUS; SOFT TISSUE EVERY 10 MIN PRN
Status: DISCONTINUED | OUTPATIENT
Start: 2017-02-14 | End: 2017-02-14 | Stop reason: HOSPADM

## 2017-02-14 RX ORDER — DROPERIDOL 2.5 MG/ML
0.62 INJECTION, SOLUTION INTRAMUSCULAR; INTRAVENOUS
Status: DISCONTINUED | OUTPATIENT
Start: 2017-02-14 | End: 2017-02-14 | Stop reason: HOSPADM

## 2017-02-14 RX ORDER — DIMENHYDRINATE 50 MG/ML
25 INJECTION, SOLUTION INTRAMUSCULAR; INTRAVENOUS
Status: COMPLETED | OUTPATIENT
Start: 2017-02-14 | End: 2017-02-14

## 2017-02-14 RX ORDER — METOCLOPRAMIDE 10 MG/1
10 TABLET ORAL EVERY 6 HOURS PRN
Status: DISCONTINUED | OUTPATIENT
Start: 2017-02-14 | End: 2017-02-14 | Stop reason: HOSPADM

## 2017-02-14 RX ORDER — LIDOCAINE HYDROCHLORIDE 10 MG/ML
INJECTION, SOLUTION INFILTRATION; PERINEURAL PRN
Status: DISCONTINUED | OUTPATIENT
Start: 2017-02-14 | End: 2017-02-14

## 2017-02-14 RX ORDER — ONDANSETRON 2 MG/ML
4 INJECTION INTRAMUSCULAR; INTRAVENOUS EVERY 30 MIN PRN
Status: COMPLETED | OUTPATIENT
Start: 2017-02-14 | End: 2017-02-14

## 2017-02-14 RX ORDER — PROPOFOL 10 MG/ML
INJECTION, EMULSION INTRAVENOUS PRN
Status: DISCONTINUED | OUTPATIENT
Start: 2017-02-14 | End: 2017-02-14

## 2017-02-14 RX ORDER — FENTANYL CITRATE 50 UG/ML
INJECTION, SOLUTION INTRAMUSCULAR; INTRAVENOUS PRN
Status: DISCONTINUED | OUTPATIENT
Start: 2017-02-14 | End: 2017-02-14

## 2017-02-14 RX ORDER — OXYCODONE AND ACETAMINOPHEN 5; 325 MG/1; MG/1
1-2 TABLET ORAL EVERY 6 HOURS PRN
Qty: 40 TABLET | Refills: 0 | Status: SHIPPED | OUTPATIENT
Start: 2017-02-14 | End: 2019-01-09

## 2017-02-14 RX ADMIN — METOPROLOL TARTRATE 2 MG: 5 INJECTION INTRAVENOUS at 15:32

## 2017-02-14 RX ADMIN — CELECOXIB 400 MG: 200 CAPSULE ORAL at 13:42

## 2017-02-14 RX ADMIN — METOPROLOL TARTRATE 3 MG: 5 INJECTION INTRAVENOUS at 15:20

## 2017-02-14 RX ADMIN — PROPOFOL 200 MG: 10 INJECTION, EMULSION INTRAVENOUS at 15:17

## 2017-02-14 RX ADMIN — CEFAZOLIN 1 G: 1 INJECTION, POWDER, FOR SOLUTION INTRAMUSCULAR; INTRAVENOUS at 16:59

## 2017-02-14 RX ADMIN — SODIUM CHLORIDE, POTASSIUM CHLORIDE, SODIUM LACTATE AND CALCIUM CHLORIDE: 600; 310; 30; 20 INJECTION, SOLUTION INTRAVENOUS at 17:00

## 2017-02-14 RX ADMIN — FENTANYL CITRATE 100 MCG: 50 INJECTION, SOLUTION INTRAMUSCULAR; INTRAVENOUS at 15:33

## 2017-02-14 RX ADMIN — DIMENHYDRINATE 25 MG: 50 INJECTION, SOLUTION INTRAMUSCULAR; INTRAVENOUS at 19:27

## 2017-02-14 RX ADMIN — ONDANSETRON 4 MG: 4 TABLET, ORALLY DISINTEGRATING ORAL at 20:05

## 2017-02-14 RX ADMIN — METOCLOPRAMIDE 10 MG: 5 INJECTION, SOLUTION INTRAMUSCULAR; INTRAVENOUS at 19:23

## 2017-02-14 RX ADMIN — SODIUM CHLORIDE, POTASSIUM CHLORIDE, SODIUM LACTATE AND CALCIUM CHLORIDE: 600; 310; 30; 20 INJECTION, SOLUTION INTRAVENOUS at 15:51

## 2017-02-14 RX ADMIN — ONDANSETRON 4 MG: 2 INJECTION INTRAMUSCULAR; INTRAVENOUS at 19:10

## 2017-02-14 RX ADMIN — OXYCODONE HYDROCHLORIDE AND ACETAMINOPHEN 2 TABLET: 5; 325 TABLET ORAL at 19:01

## 2017-02-14 RX ADMIN — ROCURONIUM BROMIDE 50 MG: 10 INJECTION INTRAVENOUS at 15:17

## 2017-02-14 RX ADMIN — HYDROMORPHONE HYDROCHLORIDE 0.5 MG: 1 INJECTION, SOLUTION INTRAMUSCULAR; INTRAVENOUS; SUBCUTANEOUS at 19:11

## 2017-02-14 RX ADMIN — SODIUM CHLORIDE, POTASSIUM CHLORIDE, SODIUM LACTATE AND CALCIUM CHLORIDE: 600; 310; 30; 20 INJECTION, SOLUTION INTRAVENOUS at 14:39

## 2017-02-14 RX ADMIN — Medication 10 MG: at 15:51

## 2017-02-14 RX ADMIN — LIDOCAINE HYDROCHLORIDE 50 MG: 10 INJECTION, SOLUTION INFILTRATION; PERINEURAL at 15:17

## 2017-02-14 RX ADMIN — HYDROMORPHONE HYDROCHLORIDE 0.5 MG: 1 INJECTION, SOLUTION INTRAMUSCULAR; INTRAVENOUS; SUBCUTANEOUS at 18:35

## 2017-02-14 RX ADMIN — FENTANYL CITRATE 100 MCG: 50 INJECTION, SOLUTION INTRAMUSCULAR; INTRAVENOUS at 15:17

## 2017-02-14 RX ADMIN — HYDRALAZINE HYDROCHLORIDE 5 MG: 20 INJECTION INTRAMUSCULAR; INTRAVENOUS at 17:22

## 2017-02-14 RX ADMIN — FENTANYL CITRATE 50 MCG: 50 INJECTION INTRAMUSCULAR; INTRAVENOUS at 18:24

## 2017-02-14 RX ADMIN — DEXAMETHASONE SODIUM PHOSPHATE 4 MG: 4 INJECTION, SOLUTION INTRAMUSCULAR; INTRAVENOUS at 15:17

## 2017-02-14 RX ADMIN — ACETAMINOPHEN 975 MG: 325 TABLET, FILM COATED ORAL at 13:38

## 2017-02-14 RX ADMIN — HYDRALAZINE HYDROCHLORIDE 5 MG: 20 INJECTION INTRAMUSCULAR; INTRAVENOUS at 17:45

## 2017-02-14 RX ADMIN — HYDROMORPHONE HYDROCHLORIDE 0.5 MG: 1 INJECTION, SOLUTION INTRAMUSCULAR; INTRAVENOUS; SUBCUTANEOUS at 18:05

## 2017-02-14 RX ADMIN — CEFAZOLIN SODIUM 2 G: 2 INJECTION, SOLUTION INTRAVENOUS at 15:03

## 2017-02-14 RX ADMIN — ACETAMINOPHEN 1000 MG: 10 INJECTION, SOLUTION INTRAVENOUS at 18:05

## 2017-02-14 NOTE — OP NOTE
DATE OF PROCEDURE:  02/14/2017      PREOPERATIVE DIAGNOSIS:  Chronic shoulder pain with impingement, acromioclavicular joint disease, glenohumeral joint disease and partial thickness rotator cuff tear.      POSTOPERATIVE DIAGNOSES:     1.  Left shoulder with chronic impingement syndrome.   2.  Chronic acromioclavicular joint DJD.   3.  Mild to moderate glenohumeral joint DJD with a degenerative labral fraying.   4.  High-grade articular-sided partial thickness rotator cuff tear involving supraspinatus and infraspinatus.      PROCEDURE:   1.  Left shoulder arthroscopy and arthroscopic subacromial decompression (acromioplasty, bursectomy and coracoacromial ligament resection).   2.  Arthroscopy distal clavicle resection.   3.  Combination of a mini open and arthroscopic rotator cuff tear repair (2 [employer] Y-Knot anchors were used for the repair).      SURGEON:  Wallace Miller MD      FIRST ASSISTANT:  Brian Loco PA-C  Assistance from the PA was necessary for the complexity of this procedure.  The control of the inflow and outflow of the fluid for the arthroscopic procedure, manipulation of the arm position and also manipulation and management of the arthroscopy instruments were carried out by the PA throughout the procedure.  Surgical incision was closed by the PA and post-surgical dressing and arm sling were applied by the PA as well.      INDICATIONS FOR THE PROCEDURE:  Ms. Areli Stubbs is currently a 57-year-old female who has had chronic shoulder pain since 1/2016 rather when she fell onto the shoulder.  She had intermittent episodic worsening and flare-up of pain.  She was found to have rather advanced coracoacromial DJD noted on x-rays and lateral downsloping acromion.  Possible rotator cuff tear was also suspected, although we did not suspect large full thickness rotator cuff tear.  With understanding the situation and options because of ongoing pain,  she decided to go ahead with surgical intervention at  this time.      DESCRIPTION OF PROCEDURE:  With satisfactory general anesthesia/intrascalene block in the lateral decubitus position, the left shoulder was prepped and draped in a routine sterile fashion.  Initially slight forward elevation and abduction was maintained with static arm positioner.  Subsequently, manipulation of the arm position was carried out and this was managed by the PA.      Initial inspection of the intra-articular space using the routine posterior portal revealed presence of degenerative changes of glenohumeral joint as expected more noticeably on the glenoid.  Anterior labrum had diffuse degenerative fraying but no actual detachment.  Biceps tendon was noted to be intact without significant tendinopathy or fraying.  Undersurface of the rotator cuff was torn as expected and the tear was more significant for the supraspinatus but partial thickness rotator cuff tear was also present for infraspinatus as well.  The area of the tear was felt to be involving more than 50% width thickness of the insertion site.      At this point, using the anterior portal, we performed a debridement of the anterior labrum as well as articular surface.  We then performed a debridement of the footprint of the insertion of the supraspinatus and infraspinatus using the shaver.  Subchondral bone was exposed at this time.      With redirection of instruments into subacromial space, we encountered hypertrophic bursitis.  Thick coracoacromial ligament was reamed noted to be present.  This was released with Arthrocare ArthroWand.  Undersurface of the acromion was debrided and subsequently acromioplasty was performed using the bur so that undersurface is flat from the posterior to anterior as well as medial to lateral.      Distal clavicle was prominent and the AC joint was quite degenerative.  Using the bur, distal clavicle resection was then carried out by removing about 9-10 mm width of the bone from the distal clavicle.   Post-surgical changes were thoroughly documented.  Bursectomy was performed also from all quadrants of the humeral head.      We made a mini open exposure by extending the lateral portal proximally.  Blunt dissection of the deltoid fibers was carried out after subcutaneous dissection was developed.  With arthroscopy instruments placed into the articular surface, again we then placed a portal through the anterior aspect of the shoulder.  We placed 2 Y-Knot anchors, 1 just posterior to the biceps groove and 1 further posteriorly to address the infraspinatus tear.      We delivered the suture through a free needle.  We caught the presized area of repair reestablishing the proximal row repair by establishing 3 horizontal mattress sutures.  Additional sutures were then used to further compress the area down by making knots from 1 anchor and then the other end again was tied so that it became a big loop.  Additional sutures were then tied locally, one anterior and one posterior.  Altogether, we were able to use 6 anchors from 2 anchors, 6 sutures from 2 anchors.  Arthroscopic visualization clearly demonstrated well established, well restored repair of the proximal row while the lateral row left intact.  Post-surgical changes were documented at this time.      With removal of the instruments, the deltoid fascia was repaired with 0 Ethibond sutures.  Subcutaneous tissue was closed with 3-0 Vicryl and skin itself was closed with staples.      Post-surgical dressing was applied and arm sling was applied.  The patient tolerated the procedure well.  No intraoperative complications noted.  Blood loss was less than 5 mL.         ASA SERGIO HUTCHISON MD             D: 2017 17:07   T: 2017 17:54   MT: [id number]      Name:     KEISHA GAMEZ   MRN:      [id number]        Account:        [id number]   :      1960           Procedure Date: 2017      Document: [id number]

## 2017-02-14 NOTE — ANESTHESIA PROCEDURE NOTES
Peripheral nerve/Neuraxial procedure note : Brachial plexus  Pre-Procedure  Performed by RUFINO JIMENEZ  Location: pre-op    Procedure Times:2/14/2017 2:32 PM and 2/14/2017 2:49 PM  Pre-Anesthestic Checklist: patient identified, IV checked, site marked, risks and benefits discussed, informed consent, monitors and equipment checked, pre-op evaluation, at physician/surgeon's request and post-op pain management    Timeout  Correct Patient: Yes   Correct Procedure: Yes   Correct Site: Yes   Correct Laterality: Yes   Correct Position: Yes   Site Marked: Yes   .   Procedure Documentation    Diagnosis:SHOULDER PAIN/DJD.    Procedure:    Brachial plexus.     Ultrasound used to identify targeted nerve, plexus, or vascular marker and placed a needle adjacent to it.   Patient Prep;mask, sterile gloves, povidone-iodine 7.5% surgical scrub.  Nerve Stim: Initial Level 1 mA. Lowest motor response mA..  Needle: insulated, short bevel (22 G. 2 in). .  Spinal Needle: . . Insertion Method: Single Shot.     Assessment/Narrative  Paresthesias: No.  .  The placement was negative for: blood aspirated, painful injection and site bleeding.  Bolus given via needle. No blood aspirated via catheter.   Secured via.   Complications: none. Test dose of mL lidocaine 2% w/ 1:200,000 epinephrine at. Test dose negative for signs of intravascular, subdural or intrathecal injection. Comments:  30ml of 0.5% Bupivicaine w/ 1:200,000 epi + 10ml of 2% Lidocaine injected    The surgeon has given a verbal order transferring care of this patient to me for the performance of a regional analgesia block for post-op pain control. It is requested of me because I am uniquely trained and qualified to perform this block and the surgeon is neither trained nor qualified to perform this procedure.

## 2017-02-14 NOTE — IP AVS SNAPSHOT
MRN:[id number]                      After Visit Summary   2/14/2017    Areli Stubbs    MRN: [id number]           Thank you!     Thank you for choosing [facility] for your care. Our goal is always to provide you with excellent care. Hearing back from our patients is one way we can continue to improve our services. Please take a few minutes to complete the written survey that you may receive in the mail after you visit. If you would like to speak to someone directly about your visit please contact Patient Relations at [phone]. Thank you!          Patient Information     Date Of Birth          1960        About your hospital stay     You were admitted on:  February 14, 2017 You last received care in the:  [facility] PreOP/PostOP    You were discharged on:  February 14, 2017       Who to Call     For medical emergencies, please call 911.  For non-urgent questions about your medical care, please call your primary care provider or clinic, [phone]  For questions related to your surgery, please call your surgery clinic        Attending Provider     Provider Specialty    Wallace Miller MD Orthopedics       Primary Care Provider Office Phone # Fax #    Soni Torres [phone]       [facility] [address]   [facility] [address]        After Care Instructions     Discharge Instructions       Review outpatient procedure discharge instructions with patient as directed by Provider    Sling for one month  Frequent elbow/wrist/finger movement  No active shoulder movement            Ice to affected area       Ice pack to surgical site every 15 minutes per hour for 24 hours            Remove dressing - at 72 hours            Return to clinic       Return to clinic as scheduled                  Your next 10 appointments already scheduled     Feb 23, 2017  8:00 AM CST   Return Visit with Brian Loco PA-C   [facility] ORTHOPEDIC  SURGERY ([facility])    [address]  [address]  [facility] [address]   [phone]              Further instructions from your care team           SHOULDER ARTHROSCOPY DISCHARGE INSTRUCTIONS  DR. AJ HUTCHISON   [phone]    MEDICATION  You are going home with a prescription narcotic pain medication(s).  After the shoulder surgery with regional block, it is very critical that you start taking the medication(s) as soon as you get home so that you can stay ahead of the pain.  If you start the medication after you started to feel the pain, it is very difficult to get the pain under control.  As long as you don't have the issue of allergy or intolerance, taking over-the-counter Ibuprofen, 400 mg every 6 hours along with the narcotic pain medication would be very helpful.  The pain medication(s) are also taken every 6 hours, either simultaneously with Ibuprofen or in an alternating fashion....taking one now and taking the other in 3 hours and so on.  If OxyContin is prescribed, it will be every 12 hours.  In most situations, post surgical pain is significantly reduced after 2 to 3 days.  You should try to wean off the narcotic pain medication as soon as possible and manage the pain with Acetaminophen (Tylenol) or over-the-counter anti-inflammatory medications (Ibuprofen/Naproxen).  Please remember all narcotics can be irritating to your stomach and constipating.    EXERCISES  You were instructed with a set of post-operative exercises by the physical therapist.  You can start doing them as soon as you get home.  Frequent gentle movement lasting 1-2 minutes is better than long infrequent sessions.  You should also do frequent finger/wrist/elbow movement exercises.    DRESSING  The sling should be continued for 1 month when standing or walking.  It is okay not to wear the sling when you are awake and sitting or lying.  You can remove the dressing in 3 days for shower and apply appropriately sized  band-aid(s) (or gauze and paper tape) to the holes and incision site after each shower.  No bathing until further instructed. From arthroscopy, there could be significant oozing of bloody fluid.  Please do not get alarmed,  Normally, it will stop in 6 to 10 hours.  During that time, re-enforce the original surgical dressing with additional gauze(s) instead of removing the dressing.    SLEEPING  Most people find sleeping in a reclined position to be more comfortable rather than flat on bed.  If you have a recliner, you should try sleeping on it.  Otherwise, propping yourself with pillows can accomplish the same.    PICTURES  If you were given a set of arthroscopy pictures when discharged, please bring them back to the office for the first post-operative visit so that they can be reviewed and explained when you are not under the influence of anesthesia.      FOLLOW UP  The post-operative visit should have been scheduled at the time you scheduled the surgery.  If it was not done, please call my office tomorrow.  For other urgent concerns, call [phone].       GENERAL ANESTHESIA OR SEDATION ADULT DISCHARGE INSTRUCTIONS   SPECIAL PRECAUTIONS FOR 24 HOURS AFTER SURGERY    IT IS NOT UNUSUAL TO FEEL LIGHT-HEADED OR FAINT, UP TO 24 HOURS AFTER SURGERY OR WHILE TAKING PAIN MEDICATION.  IF YOU HAVE THESE SYMPTOMS; SIT FOR A FEW MINUTES BEFORE STANDING AND HAVE SOMEONE ASSIST YOU WHEN YOU GET UP TO WALK OR USE THE BATHROOM.    YOU SHOULD REST AND RELAX FOR THE NEXT 24 HOURS AND YOU MUST MAKE ARRANGEMENTS TO HAVE SOMEONE STAY WITH YOU FOR AT LEAST 24 HOURS AFTER YOUR DISCHARGE.  AVOID HAZARDOUS AND STRENUOUS ACTIVITIES.  DO NOT MAKE IMPORTANT DECISIONS FOR 24 HOURS.    DO NOT DRIVE ANY VEHICLE OR OPERATE MECHANICAL EQUIPMENT FOR 24 HOURS FOLLOWING THE END OF YOUR SURGERY.  EVEN THOUGH YOU MAY FEEL NORMAL, YOUR REACTIONS MAY BE AFFECTED BY THE MEDICATION YOU HAVE RECEIVED.    DO NOT DRINK ALCOHOLIC BEVERAGES FOR 24 HOURS  FOLLOWING YOUR SURGERY.    DRINK CLEAR LIQUIDS (APPLE JUICE, GINGER ALE, 7-UP, BROTH, ETC.).  PROGRESS TO YOUR REGULAR DIET AS YOU FEEL ABLE.    YOU MAY HAVE A DRY MOUTH, A SORE THROAT, MUSCLES ACHES OR TROUBLE SLEEPING.  THESE SHOULD GO AWAY AFTER 24 HOURS.    CALL YOUR DOCTOR FOR ANY OF THE FOLLOWING:  SIGNS OF INFECTION (FEVER, GROWING TENDERNESS AT THE SURGERY SITE, A LARGE AMOUNT OF DRAINAGE OR BLEEDING, SEVERE PAIN, FOUL-SMELLING DRAINAGE, REDNESS OR SWELLING.    IT HAS BEEN OVER 8 TO 10 HOURS SINCE SURGERY AND YOU ARE STILL NOT ABLE TO URINATE (PASS WATER).         You had two oxycodone-acetaminophen (Percocet) 5-325 MG at 7 PM.    Maximum acetaminophen (Tylenol) dose from all sources should not exceed 4 grams (4000 mg) per day.  You have had 1,975 mg here today.            Pending Results     No orders found from 2/12/2017 to 2/15/2017.            Admission Information     Date & Time Provider Department Dept. Phone    2/14/2017 Wallace Miller MD [facility] PreOP/PostOP [phone]      Your Vitals Were     Blood Pressure Pulse Respirations Weight Last Period Pulse Oximetry    147/90 94 15 117.9 kg (260 lb) 06/15/2006 95%    BMI (Body Mass Index)                   43.27 kg/m2           [employer] Information     [employer] gives you secure access to your electronic health record. If you see a primary care provider, you can also send messages to your care team and make appointments. If you have questions, please call your primary care clinic.  If you do not have a primary care provider, please call [phone] and they will assist you.        Care EveryWhere ID     This is your Care EveryWhere ID. This could be used by other organizations to access your [facility] medical records  [id number]           Review of your medicines      UNREVIEWED medicines. Ask your doctor about these medicines        Dose / Directions    aspirin 81 MG tablet   Used for:  Diabetes type 2, uncontrolled (H)        Dose:  81 mg    Take 1 tablet (81 mg) by mouth daily   Quantity:  100 tablet   Refills:  3       Calcium Carb-Cholecalciferol 500-400 MG-UNIT Tabs   Commonly known as:  calcium 500 + D   Used for:  Obesity due to excess calories, unspecified obesity severity        Dose:  1 tablet   Take 1 tablet by mouth 3 times daily   Quantity:  270 tablet   Refills:  0       cholecalciferol 2000 UNITS Caps   Used for:  Obesity due to excess calories, unspecified obesity severity        Dose:  1 capsule   Take 1 capsule by mouth daily   Quantity:  90 capsule   Refills:  0       cyclobenzaprine 10 MG tablet   Commonly known as:  FLEXERIL   Used for:  Left anterior shoulder pain        Dose:  10 mg   Take 1 tablet (10 mg) by mouth 3 times daily as needed for muscle spasms   Quantity:  30 tablet   Refills:  1       erythromycin ophthalmic ointment   Commonly known as:  ROMYCIN   Used for:  Skin tag        Apply to incision sites three times a day   Quantity:  1 Tube   Refills:  0       HYDROcodone-acetaminophen 5-325 MG per tablet   Commonly known as:  NORCO   Used for:  Left shoulder pain, unspecified chronicity        Dose:  1 tablet   Take 1 tablet by mouth every 6 hours as needed   Quantity:  15 tablet   Refills:  0       ibuprofen 600 MG tablet   Commonly known as:  ADVIL/MOTRIN        Take by mouth every 6 hours as needed   Refills:  0       metFORMIN 500 MG 24 hr tablet   Commonly known as:  GLUCOPHAGE-XR   Used for:  Type 2 diabetes mellitus without complication (H)        Dose:  1000 mg   Take 2 tablets (1,000 mg) by mouth daily (with dinner)   Quantity:  60 tablet   Refills:  1       methylPREDNISolone 4 MG tablet   Commonly known as:  MEDROL DOSEPAK   Used for:  Left anterior shoulder pain        Follow package instructions   Quantity:  21 tablet   Refills:  0       nabumetone 750 MG tablet   Commonly known as:  RELAFEN   Used for:  Primary osteoarthritis, unspecified site, Groin injury, initial encounter        Dose:  750 mg   Take 1  tablet (750 mg) by mouth daily   Quantity:  30 tablet   Refills:  1       QC DAILY MULTIVITAMINS/IRON Tabs   Used for:  Obesity due to excess calories, unspecified obesity severity        Dose:  1 tablet   Take 1 tablet by mouth daily   Quantity:  90 tablet   Refills:  0         START taking        Dose / Directions    oxyCODONE-acetaminophen 5-325 MG per tablet   Commonly known as:  PERCOCET   Used for:  Post-op pain        Dose:  1-2 tablet   Take 1-2 tablets by mouth every 6 hours as needed for pain (moderate to severe)   Quantity:  40 tablet   Refills:  0            Where to get your medicines      Some of these will need a paper prescription and others can be bought over the counter. Ask your nurse if you have questions.     Bring a paper prescription for each of these medications     oxyCODONE-acetaminophen 5-325 MG per tablet                Protect others around you: Learn how to safely use, store and throw away your medicines at www.disposemymeds.org.             Medication List: This is a list of all your medications and when to take them. Check marks below indicate your daily home schedule. Keep this list as a reference.      Medications           Morning Afternoon Evening Bedtime As Needed    aspirin 81 MG tablet   Take 1 tablet (81 mg) by mouth daily                                Calcium Carb-Cholecalciferol 500-400 MG-UNIT Tabs   Commonly known as:  calcium 500 + D   Take 1 tablet by mouth 3 times daily                                cholecalciferol 2000 UNITS Caps   Take 1 capsule by mouth daily                                cyclobenzaprine 10 MG tablet   Commonly known as:  FLEXERIL   Take 1 tablet (10 mg) by mouth 3 times daily as needed for muscle spasms                                erythromycin ophthalmic ointment   Commonly known as:  ROMYCIN   Apply to incision sites three times a day                                HYDROcodone-acetaminophen 5-325 MG per tablet   Commonly known as:  NORCO    Take 1 tablet by mouth every 6 hours as needed                                ibuprofen 600 MG tablet   Commonly known as:  ADVIL/MOTRIN   Take by mouth every 6 hours as needed                                metFORMIN 500 MG 24 hr tablet   Commonly known as:  GLUCOPHAGE-XR   Take 2 tablets (1,000 mg) by mouth daily (with dinner)                                methylPREDNISolone 4 MG tablet   Commonly known as:  MEDROL DOSEPAK   Follow package instructions                                nabumetone 750 MG tablet   Commonly known as:  RELAFEN   Take 1 tablet (750 mg) by mouth daily                                oxyCODONE-acetaminophen 5-325 MG per tablet   Commonly known as:  PERCOCET   Take 1-2 tablets by mouth every 6 hours as needed for pain (moderate to severe)   Last time this was given:  2 tablets on 2/14/2017  7:01 PM                                QC DAILY MULTIVITAMINS/IRON Tabs   Take 1 tablet by mouth daily

## 2017-02-14 NOTE — ANESTHESIA CARE TRANSFER NOTE
Patient: Areli Stubbs    Procedure(s):  left shoulder arthroscopy, decompression, distal clavicle resection with rotator cuff repair  - Wound Class: I-Clean    Diagnosis: subacromial impingment left shoulder, clavicular joint arthritis   Diagnosis Additional Information: No value filed.    Anesthesia Type:   General     Note:  Airway :Face Mask  Patient transferred to:PACU        Vitals: (Last set prior to Anesthesia Care Transfer)    CRNA VITALS  2/14/2017 1639 - 2/14/2017 1714      2/14/2017             Pulse: 96    SpO2: 98 %    Resp Rate (observed): 19                Electronically Signed By: BRIAN Xie CRNA  February 14, 2017  5:14 PM

## 2017-02-14 NOTE — ANESTHESIA POSTPROCEDURE EVALUATION
Patient: Areli Stubbs    Procedure(s):  left shoulder arthroscopy, decompression, distal clavicle resection with rotator cuff repair  - Wound Class: I-Clean    Diagnosis:subacromial impingment left shoulder, clavicular joint arthritis   Diagnosis Additional Information: subacromial impingment left shoulder, clavicular joint arthritis   Dr. Miller    Anesthesia Type:  General, Peripheral Nerve Block for post-op pain at surgeon's request    Note:  Anesthesia Post Evaluation    Patient location during evaluation: PACU  Patient participation: Able to participate in evaluation but full recovery from regional anesthesia has not yet ocurrred but is anticipated to occur within 48 hours  Level of consciousness: awake  Pain management: adequate  Airway patency: patent  Cardiovascular status: acceptable  Respiratory status: acceptable  Hydration status: acceptable  PONV: none             Last vitals:  Vitals:    02/14/17 1454 02/14/17 1712 02/14/17 1722   BP: 125/88  (!) 163/114   Pulse: 94     Resp: 14     SpO2: 99% 91%          Electronically Signed By: Edward Sandoval MD  February 14, 2017  5:31 PM

## 2017-02-14 NOTE — IP AVS SNAPSHOT
[facility] PreOP/PostOP    [address]    [facility] [address]    Phone:  [phone]    Fax:  [phone]                                       After Visit Summary   2/14/2017    Areli Stubbs    MRN: [id number]           After Visit Summary Signature Page     I have received my discharge instructions, and my questions have been answered. I have discussed any challenges I see with this plan with the nurse or doctor.    ..........................................................................................................................................  Patient/Patient Representative Signature      ..........................................................................................................................................  Patient Representative Print Name and Relationship to Patient    ..................................................               ................................................  Date                                            Time    ..........................................................................................................................................  Reviewed by Signature/Title    ...................................................              ..............................................  Date                                                            Time

## 2017-02-14 NOTE — ANESTHESIA PREPROCEDURE EVALUATION
Anesthesia Evaluation     . Pt has had prior anesthetic. Type: General      ROS/MED HX    ENT/Pulmonary:  - neg pulmonary ROS     Neurologic:  - neg neurologic ROS     Cardiovascular:     (+) hypertension----. : . . . :. .       METS/Exercise Tolerance:     Hematologic:  - neg hematologic  ROS       Musculoskeletal:   (+) , , other musculoskeletal- shoulder pain      GI/Hepatic:     (+) GERD Asymptomatic on medication,       Renal/Genitourinary:  - ROS Renal section negative       Endo:     (+) type II DM Not using insulin - not using insulin pump not previously admitted for DM/DKA Obesity, .      Psychiatric:  - neg psychiatric ROS       Infectious Disease:  - neg infectious disease ROS       Malignancy:      - no malignancy   Other:    (+) No chance of pregnancy C-spine cleared: N/A, no H/O Chronic Pain,no other significant disability   - neg other ROS           Physical Exam  Normal systems: cardiovascular, pulmonary and dental    Airway   Mallampati: II  TM distance: >3 FB  Neck ROM: full    Dental     Cardiovascular       Pulmonary                     Anesthesia Plan      History & Physical Review  History and physical reviewed and following examination; no interval change.    ASA Status:  2 .    NPO Status:  > 8 hours    Plan for General with Intravenous induction. Maintenance will be Balanced.    PONV prophylaxis:  Ondansetron (or other 5HT-3) and Dexamethasone or Solumedrol       Postoperative Care  Postoperative pain management:  IV analgesics.      Consents  Anesthetic plan, risks, benefits and alternatives discussed with:  Patient.  Use of blood products discussed: Yes.   Use of blood products discussed with Patient.  Consented to blood products.  .                          .

## 2017-02-14 NOTE — BRIEF OP NOTE
[facility]  Orthopedics Brief Operative Note    Pre-operative diagnosis: subacromial impingment left shoulder, clavicular joint arthritis    Post-operative diagnosis * No post-op diagnosis entered *   Procedure: Procedure(s):  ARTHROSCOPY SHOULDER ROTATOR CUFF REPAIR   Surgeon: Wallace Miller MD   Assistants(s): TONNY Clark   Anesthesia: Combined General with Block    Estimated blood loss: * No blood loss amount entered *                Drains: None   Specimens: None   Implants: See the dictated op note       Complications: None

## 2017-02-15 ENCOUNTER — TELEPHONE (OUTPATIENT)
Dept: ORTHOPEDICS | Facility: CLINIC | Age: 57
End: 2017-02-15

## 2017-02-15 NOTE — DISCHARGE INSTRUCTIONS
SHOULDER ARTHROSCOPY DISCHARGE INSTRUCTIONS  DR. AJ HUTCHISON   [phone]    MEDICATION  You are going home with a prescription narcotic pain medication(s).  After the shoulder surgery with regional block, it is very critical that you start taking the medication(s) as soon as you get home so that you can stay ahead of the pain.  If you start the medication after you started to feel the pain, it is very difficult to get the pain under control.  As long as you don't have the issue of allergy or intolerance, taking over-the-counter Ibuprofen, 400 mg every 6 hours along with the narcotic pain medication would be very helpful.  The pain medication(s) are also taken every 6 hours, either simultaneously with Ibuprofen or in an alternating fashion....taking one now and taking the other in 3 hours and so on.  If OxyContin is prescribed, it will be every 12 hours.  In most situations, post surgical pain is significantly reduced after 2 to 3 days.  You should try to wean off the narcotic pain medication as soon as possible and manage the pain with Acetaminophen (Tylenol) or over-the-counter anti-inflammatory medications (Ibuprofen/Naproxen).  Please remember all narcotics can be irritating to your stomach and constipating.    EXERCISES  You were instructed with a set of post-operative exercises by the physical therapist.  You can start doing them as soon as you get home.  Frequent gentle movement lasting 1-2 minutes is better than long infrequent sessions.  You should also do frequent finger/wrist/elbow movement exercises.    DRESSING  The sling should be continued for 1 month when standing or walking.  It is okay not to wear the sling when you are awake and sitting or lying.  You can remove the dressing in 3 days for shower and apply appropriately sized band-aid(s) (or gauze and paper tape) to the holes and incision site after each shower.  No bathing until further instructed. From arthroscopy, there could be significant  oozing of bloody fluid.  Please do not get alarmed,  Normally, it will stop in 6 to 10 hours.  During that time, re-enforce the original surgical dressing with additional gauze(s) instead of removing the dressing.    SLEEPING  Most people find sleeping in a reclined position to be more comfortable rather than flat on bed.  If you have a recliner, you should try sleeping on it.  Otherwise, propping yourself with pillows can accomplish the same.    PICTURES  If you were given a set of arthroscopy pictures when discharged, please bring them back to the office for the first post-operative visit so that they can be reviewed and explained when you are not under the influence of anesthesia.      FOLLOW UP  The post-operative visit should have been scheduled at the time you scheduled the surgery.  If it was not done, please call my office tomorrow.  For other urgent concerns, call [phone].       GENERAL ANESTHESIA OR SEDATION ADULT DISCHARGE INSTRUCTIONS   SPECIAL PRECAUTIONS FOR 24 HOURS AFTER SURGERY    IT IS NOT UNUSUAL TO FEEL LIGHT-HEADED OR FAINT, UP TO 24 HOURS AFTER SURGERY OR WHILE TAKING PAIN MEDICATION.  IF YOU HAVE THESE SYMPTOMS; SIT FOR A FEW MINUTES BEFORE STANDING AND HAVE SOMEONE ASSIST YOU WHEN YOU GET UP TO WALK OR USE THE BATHROOM.    YOU SHOULD REST AND RELAX FOR THE NEXT 24 HOURS AND YOU MUST MAKE ARRANGEMENTS TO HAVE SOMEONE STAY WITH YOU FOR AT LEAST 24 HOURS AFTER YOUR DISCHARGE.  AVOID HAZARDOUS AND STRENUOUS ACTIVITIES.  DO NOT MAKE IMPORTANT DECISIONS FOR 24 HOURS.    DO NOT DRIVE ANY VEHICLE OR OPERATE MECHANICAL EQUIPMENT FOR 24 HOURS FOLLOWING THE END OF YOUR SURGERY.  EVEN THOUGH YOU MAY FEEL NORMAL, YOUR REACTIONS MAY BE AFFECTED BY THE MEDICATION YOU HAVE RECEIVED.    DO NOT DRINK ALCOHOLIC BEVERAGES FOR 24 HOURS FOLLOWING YOUR SURGERY.    DRINK CLEAR LIQUIDS (APPLE JUICE, GINGER ALE, 7-UP, BROTH, ETC.).  PROGRESS TO YOUR REGULAR DIET AS YOU FEEL ABLE.    YOU MAY HAVE A DRY MOUTH, A  SORE THROAT, MUSCLES ACHES OR TROUBLE SLEEPING.  THESE SHOULD GO AWAY AFTER 24 HOURS.    CALL YOUR DOCTOR FOR ANY OF THE FOLLOWING:  SIGNS OF INFECTION (FEVER, GROWING TENDERNESS AT THE SURGERY SITE, A LARGE AMOUNT OF DRAINAGE OR BLEEDING, SEVERE PAIN, FOUL-SMELLING DRAINAGE, REDNESS OR SWELLING.    IT HAS BEEN OVER 8 TO 10 HOURS SINCE SURGERY AND YOU ARE STILL NOT ABLE TO URINATE (PASS WATER).         You had two oxycodone-acetaminophen (Percocet) 5-325 MG at 7 PM.    Maximum acetaminophen (Tylenol) dose from all sources should not exceed 4 grams (4000 mg) per day.  You have had 1,975 mg here today.

## 2017-02-15 NOTE — TELEPHONE ENCOUNTER
Spoke with patient.  Doing well, no questions at this time.  Offered number for nurse triage and confirmed follow up appointment.    Reminded proper sling and arm use.     Brian Loco PA-C  [facility] - Surgery

## 2017-02-23 ENCOUNTER — OFFICE VISIT (OUTPATIENT)
Dept: ORTHOPEDICS | Facility: CLINIC | Age: 57
End: 2017-02-23
Payer: COMMERCIAL

## 2017-02-23 DIAGNOSIS — Z47.89 ORTHOPEDIC AFTERCARE: Primary | ICD-10-CM

## 2017-02-23 DIAGNOSIS — Z98.890 S/P ROTATOR CUFF REPAIR: ICD-10-CM

## 2017-02-23 DIAGNOSIS — M75.101 TEAR OF RIGHT ROTATOR CUFF, UNSPECIFIED TEAR EXTENT: ICD-10-CM

## 2017-02-23 PROCEDURE — 99024 POSTOP FOLLOW-UP VISIT: CPT | Performed by: PHYSICIAN ASSISTANT

## 2017-02-23 NOTE — TELEPHONE ENCOUNTER
Attending Physician Statement faxed to [facility] at: [phone]. Copy sent to amaris Gaming RN

## 2017-02-23 NOTE — PATIENT INSTRUCTIONS
Incision care instructions:  Keep dry 24 hours.  Showering is ok after that time, however no soaking or scrubbing of incision for 2 weeks.  Steri-strips will most likely fall off on their own, however they may be removed after 2 weeks with rubbing alcohol if they have not.    Sling on when up and moving, may remove if resting or doing pendulum exercises.  Gradually taper after 4 weeks.      PT to start at 4 weeks after surgery.  May call [phone] to schedule. '    Follow up in 4 weeks, ie before return to work.

## 2017-02-23 NOTE — MR AVS SNAPSHOT
After Visit Summary   2/23/2017    Areli Stubbs    MRN: [id number]           Patient Information     Date Of Birth          1960        Visit Information        Provider Department      2/23/2017 8:00 AM Brian Loco PA-C [facility] ORTHOPEDIC SURGERY        Today's Diagnoses     Orthopedic aftercare    -  1      Care Instructions    Incision care instructions:  Keep dry 24 hours.  Showering is ok after that time, however no soaking or scrubbing of incision for 2 weeks.  Steri-strips will most likely fall off on their own, however they may be removed after 2 weeks with rubbing alcohol if they have not.    Sling on when up and moving, may remove if resting or doing pendulum exercises.  Gradually taper after 4 weeks.      PT to start at 4 weeks after surgery.  May call [phone] to schedule. '    Follow up in 4 weeks, ie before return to work.        Follow-ups after your visit        Follow-up notes from your care team     Return in about 4 weeks (around 3/23/2017).      Who to contact     If you have questions or need follow up information about today's clinic visit or your schedule please contact [facility] ORTHOPEDIC SURGERY directly at [phone].  Normal or non-critical lab and imaging results will be communicated to you by MyChart, letter or phone within 4 business days after the clinic has received the results. If you do not hear from us within 7 days, please contact the clinic through [employer] or phone. If you have a critical or abnormal lab result, we will notify you by phone as soon as possible.  Submit refill requests through [employer] or call your pharmacy and they will forward the refill request to us. Please allow 3 business days for your refill to be completed.          Additional Information About Your Visit        [employer] Information     [employer] gives you secure access to your electronic health record. If you see a primary care provider, you can also send  messages to your care team and make appointments. If you have questions, please call your primary care clinic.  If you do not have a primary care provider, please call [phone] and they will assist you.        Care EveryWhere ID     This is your Care EveryWhere ID. This could be used by other organizations to access your [place] medical records  [id number]        Your Vitals Were     Last Period                   06/15/2006            Blood Pressure from Last 3 Encounters:   02/14/17 150/84   01/25/17 154/80   09/28/16 142/80    Weight from Last 3 Encounters:   02/14/17 260 lb (117.9 kg)   01/25/17 262 lb 4.8 oz (119 kg)   09/28/16 260 lb (117.9 kg)              Today, you had the following     No orders found for display       Primary Care Provider Office Phone # Fax #    Soni Torres [phone]       [facility] [address]   [facility] [address]        Thank you!     Thank you for choosing [facility] ORTHOPEDIC SURGERY  for your care. Our goal is always to provide you with excellent care. Hearing back from our patients is one way we can continue to improve our services. Please take a few minutes to complete the written survey that you may receive in the mail after your visit with us. Thank you!             Your Updated Medication List - Protect others around you: Learn how to safely use, store and throw away your medicines at www.disposemymeds.org.          This list is accurate as of: 2/23/17  8:28 AM.  Always use your most recent med list.                   Brand Name Dispense Instructions for use    aspirin 81 MG tablet     100 tablet    Take 1 tablet (81 mg) by mouth daily       Calcium Carb-Cholecalciferol 500-400 MG-UNIT Tabs    calcium 500 + D    270 tablet    Take 1 tablet by mouth 3 times daily       cholecalciferol 2000 UNITS Caps     90 capsule    Take 1 capsule by mouth daily       cyclobenzaprine 10 MG tablet    FLEXERIL    30 tablet    Take 1 tablet  (10 mg) by mouth 3 times daily as needed for muscle spasms       erythromycin ophthalmic ointment    ROMYCIN    1 Tube    Apply to incision sites three times a day       HYDROcodone-acetaminophen 5-325 MG per tablet    NORCO    15 tablet    Take 1 tablet by mouth every 6 hours as needed       ibuprofen 600 MG tablet    ADVIL/MOTRIN     Take by mouth every 6 hours as needed       metFORMIN 500 MG 24 hr tablet    GLUCOPHAGE-XR    60 tablet    Take 2 tablets (1,000 mg) by mouth daily (with dinner)       methylPREDNISolone 4 MG tablet    MEDROL DOSEPAK    21 tablet    Follow package instructions       nabumetone 750 MG tablet    RELAFEN    30 tablet    Take 1 tablet (750 mg) by mouth daily       oxyCODONE-acetaminophen 5-325 MG per tablet    PERCOCET    40 tablet    Take 1-2 tablets by mouth every 6 hours as needed for pain (moderate to severe)       QC DAILY MULTIVITAMINS/IRON Tabs     90 tablet    Take 1 tablet by mouth daily

## 2017-02-23 NOTE — TELEPHONE ENCOUNTER
STD forms completed, est RTW 3/21/2017 due to 2.14.17 RCR, DR. Angela Loco PA-C  [facility] - Surgery

## 2017-03-13 ENCOUNTER — THERAPY VISIT (OUTPATIENT)
Dept: PHYSICAL THERAPY | Facility: CLINIC | Age: 57
End: 2017-03-13
Payer: COMMERCIAL

## 2017-03-13 DIAGNOSIS — Z47.89 AFTERCARE FOLLOWING SURGERY OF THE MUSCULOSKELETAL SYSTEM: ICD-10-CM

## 2017-03-13 DIAGNOSIS — M25.512 PAIN IN JOINT OF LEFT SHOULDER: ICD-10-CM

## 2017-03-13 PROCEDURE — 97110 THERAPEUTIC EXERCISES: CPT | Mod: GP | Performed by: PHYSICAL THERAPIST

## 2017-03-13 PROCEDURE — 97530 THERAPEUTIC ACTIVITIES: CPT | Mod: GP | Performed by: PHYSICAL THERAPIST

## 2017-03-13 PROCEDURE — 97161 PT EVAL LOW COMPLEX 20 MIN: CPT | Mod: GP | Performed by: PHYSICAL THERAPIST

## 2017-03-13 NOTE — PROGRESS NOTES
Subjective:    Areli Stubbs is a 57 year old female with a left shoulder condition.  Condition occurred with:  Unknown cause.  Condition occurred: for unknown reasons.  This is a new condition  Pt c/o L shoulder pain and ROM restrictions S/P L shoulder decompression/RCR 2/14/17.  Has been in sling since surgery..    Patient reports pain:  Anterior, medial and lateral.  Radiates to:  Upper arm.  Pain is described as shooting, stabbing, sharp and aching and is constant and reported as 8/10.  Associated symptoms:  Catching, numbness, loss of motion/stiffness and loss of strength. Pain is the same all the time.  Symptoms are exacerbated by using arm overhead, using arm behind back, using arm at shoulder level, lifting, carrying and lying on extremity and relieved by rest, analgesics and ice.  Since onset symptoms are gradually improving.        General health as reported by patient is fair.  Pertinent medical history includes:  High blood pressure, osteoarthritis, smoking, overweight and diabetes.  Medical allergies: yes (sulfa and tape).  Other surgeries include:  Orthopedic surgery and other (hysterectomy and R TKA and L knee scope).  Medication history: metformin er.  Current occupation is Facilities assigner.  Patient is currently not working due to present treatment problem.                                Objective:    System                   Shoulder Evaluation:  ROM:    PROM:    Flexion:  Left:  115        Extension:  Left:  45      Abduction:  Left:  56          External Rotation:  Left:  45                Extension/Internal Rotation:  Left:  L3            Strength:  : poor scap retraction.                                                               General     ROS    Assessment/Plan:      Patient is a 57 year old female with left side shoulder complaints.    Patient has the following significant findings with corresponding treatment plan.                Diagnosis 1:  S/P L shoulder decompression/RCR  Pain  -  hot/cold therapy and home program  Decreased ROM/flexibility - manual therapy and therapeutic exercise  Decreased strength - therapeutic exercise and therapeutic activities  Impaired muscle performance - neuro re-education  Decreased function - therapeutic activities  Impaired posture - neuro re-education    Therapy Evaluation Codes:   1) History comprised of:   Personal factors that impact the plan of care:      None.    Comorbidity factors that impact the plan of care are:      Diabetes, High blood pressure, Osteoarthritis and Overweight.     Medications impacting care: Anti-inflammatory and Pain.  2) Examination of Body Systems comprised of:   Body structures and functions that impact the plan of care:      Shoulder.   Activity limitations that impact the plan of care are:      Bathing, Cooking, Driving, Dressing, Lifting, Working and Sleeping.  3) Clinical presentation characteristics are:   Stable/Uncomplicated.  4) Decision-Making    Low complexity using standardized patient assessment instrument and/or measureable assessment of functional outcome.  Cumulative Therapy Evaluation is: Low complexity.    Previous and current functional limitations:  (See Goal Flow Sheet for this information)    Short term and Long term goals: (See Goal Flow Sheet for this information)     Communication ability:  Patient appears to be able to clearly communicate and understand verbal and written communication and follow directions correctly.  Treatment Explanation - The following has been discussed with the patient:   RX ordered/plan of care  Anticipated outcomes  Possible risks and side effects  This patient would benefit from PT intervention to resume normal activities.   Rehab potential is excellent.    Frequency:  2 X week, once daily  Duration:  for 2 weeks tapering to 1 X a week over 6 weeks  Discharge Plan:  Achieve all LTG.  Independent in home treatment program.  Reach maximal therapeutic benefit.    Please refer to  the daily flowsheet for treatment today, total treatment time and time spent performing 1:1 timed codes.

## 2017-03-13 NOTE — MR AVS SNAPSHOT
After Visit Summary   3/13/2017    Areli Stubbs    MRN: [id number]           Patient Information     Date Of Birth          1960        Visit Information        Provider Department      3/13/2017 9:30 AM Shiva Sylvester PT [facility] Physical Therapy        Today's Diagnoses     Pain in joint of left shoulder        Aftercare following surgery of the musculoskeletal system           Follow-ups after your visit        Your next 10 appointments already scheduled     Mar 20, 2017  9:30 AM CDT   CINDY Extremity with Shiva Sylvester PT   [facility] Physical Therapy ([facility])    [address]  [facility] [id number] [phone]              Who to contact     If you have questions or need follow up information about today's clinic visit or your schedule please contact [facility] PHYSICAL THERAPY directly at [phone].  Normal or non-critical lab and imaging results will be communicated to you by [employer], letter or phone within 4 business days after the clinic has received the results. If you do not hear from us within 7 days, please contact the clinic through [employer] or phone. If you have a critical or abnormal lab result, we will notify you by phone as soon as possible.  Submit refill requests through [employer] or call your pharmacy and they will forward the refill request to us. Please allow 3 business days for your refill to be completed.          Additional Information About Your Visit        [employer] Information     [employer] gives you secure access to your electronic health record. If you see a primary care provider, you can also send messages to your care team and make appointments. If you have questions, please call your primary care clinic.  If you do not have a primary care provider, please call [phone] and they will assist you.        Care EveryWhere  ID     This is your Care EveryWhere ID. This could be used by other organizations to access your [place] medical records  [id number]        Your Vitals Were     Last Period                   06/15/2006            Blood Pressure from Last 3 Encounters:   02/14/17 150/84   01/25/17 154/80   09/28/16 142/80    Weight from Last 3 Encounters:   02/14/17 117.9 kg (260 lb)   01/25/17 119 kg (262 lb 4.8 oz)   09/28/16 117.9 kg (260 lb)              We Performed the Following     HC PT EVAL, LOW COMPLEXITY     CINDY INITIAL EVAL REPORT     THERAPEUTIC ACTIVITIES     THERAPEUTIC EXERCISES        Primary Care Provider Office Phone # Fax #    Soni Torres [phone]       [facility]   [facility] [address]        Thank you!     Thank you for choosing [facility] PHYSICAL THERAPY  for your care. Our goal is always to provide you with excellent care. Hearing back from our patients is one way we can continue to improve our services. Please take a few minutes to complete the written survey that you may receive in the mail after your visit with us. Thank you!             Your Updated Medication List - Protect others around you: Learn how to safely use, store and throw away your medicines at www.disposemymeds.org.          This list is accurate as of: 3/13/17 10:00 AM.  Always use your most recent med list.                   Brand Name Dispense Instructions for use    aspirin 81 MG tablet     100 tablet    Take 1 tablet (81 mg) by mouth daily       Calcium Carb-Cholecalciferol 500-400 MG-UNIT Tabs    calcium 500 + D    270 tablet    Take 1 tablet by mouth 3 times daily       cholecalciferol 2000 UNITS Caps     90 capsule    Take 1 capsule by mouth daily       cyclobenzaprine 10 MG tablet    FLEXERIL    30 tablet    Take 1 tablet (10 mg) by mouth 3 times daily as needed for muscle spasms       erythromycin ophthalmic ointment    ROMYCIN    1 Tube     Apply to incision sites three times a day       HYDROcodone-acetaminophen 5-325 MG per tablet    NORCO    15 tablet    Take 1 tablet by mouth every 6 hours as needed       ibuprofen 600 MG tablet    ADVIL/MOTRIN     Take by mouth every 6 hours as needed       metFORMIN 500 MG 24 hr tablet    GLUCOPHAGE-XR    60 tablet    Take 2 tablets (1,000 mg) by mouth daily (with dinner)       methylPREDNISolone 4 MG tablet    MEDROL DOSEPAK    21 tablet    Follow package instructions       nabumetone 750 MG tablet    RELAFEN    30 tablet    Take 1 tablet (750 mg) by mouth daily       oxyCODONE-acetaminophen 5-325 MG per tablet    PERCOCET    40 tablet    Take 1-2 tablets by mouth every 6 hours as needed for pain (moderate to severe)       QC DAILY MULTIVITAMINS/IRON Tabs     90 tablet    Take 1 tablet by mouth daily

## 2017-03-15 ENCOUNTER — TELEPHONE (OUTPATIENT)
Dept: ORTHOPEDICS | Facility: CLINIC | Age: 57
End: 2017-03-15

## 2017-03-15 NOTE — TELEPHONE ENCOUNTER
She called and LVM that she is wondering about needing a follow up appointment with Obdulio or Dr. Miller prior to returning to work next week.    Per Chart review she was to follow up for weeks after her last appointment.     I called her back and told her that is was recommended to follow up in clinic.     Kaleb Garcia, ATC

## 2017-03-16 ENCOUNTER — OFFICE VISIT (OUTPATIENT)
Dept: ORTHOPEDICS | Facility: CLINIC | Age: 57
End: 2017-03-16
Payer: COMMERCIAL

## 2017-03-16 DIAGNOSIS — Z47.89 ORTHOPEDIC AFTERCARE: Primary | ICD-10-CM

## 2017-03-16 PROCEDURE — 99024 POSTOP FOLLOW-UP VISIT: CPT | Performed by: PHYSICIAN ASSISTANT

## 2017-03-16 NOTE — LETTER
3/16/2017       RE: Areli Stubbs  [address] [facility] [address]           Dear Colleague,    Thank you for referring your patient, Areli Stubbs, to the [facility] ORTHOPEDIC SURGERY. Please see a copy of my visit note below.    HISTORY OF PRESENT ILLNESS:    Areli Stubbs is a 57 year old female who is seen in follow up for Left shoulder arthroscopic DEC/DCR, mini open RCR, DOS 2/14/2017, Dr. Miller..  Present symptoms: most pain is at night.  Sleep stil disturbed.  PRN IBuprofen or tylenol.  Tapered sling.  Heat helps. Using shoulder for ADLs.  No new complaints.  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    Denies Chest pain, Calve pain, Fever, Chills.    Current Treatment: post op, PT.     PHYSICAL EXAM:  LMP 06/15/2006  There is no height or weight on file to calculate BMI.   GENERAL APPEARANCE: healthy, alert and no distress   PSYCH:  mentation appears normal and affect normal/bright    MSK:  Left: Shoulder .  Ambulates: WNG.  Incision clean and dry, well healing.  NO incisional erythema.   No Ecchymosis.  Edema min  CMS: miky incisional numbness, otherwise grossly intact.   Per PT, PROM [address].      IMAGING INTERPRETATION:  [employer] today.    ASSESSMENT:  Areli Stubbs is a 57 year old female S/P Left shoulder arthroscopic DEC/DCR, mini open RCR, DOS 2/14/2017, Dr. Miller.  Doing well.  Possibly overactive.    PLAN:  - Care instructions given and verbally acknowledged.  - Medications: no changes.  - Physical Therapy: PROM 4 weeks, then AROM progression starting with assisted ROM.  During Assisted ROM, Once PROM is greater than 140 degrees of Flexion, pt may progress to Strengthening as tolerated.    - limit shoulder motions.     Return to clinic 2, 4, weeks    Brian Loco PA-C    Dept. Orthopedic Surgery  [facility]   3/16/2017        Again, thank you for allowing me to participate in the care of your patient.        Sincerely,    Brian Loco PA-C

## 2017-03-16 NOTE — MR AVS SNAPSHOT
After Visit Summary   3/16/2017    Areli Stubbs    MRN: [id number]           Patient Information     Date Of Birth          1960        Visit Information        Provider Department      3/16/2017 9:20 AM Brian Loco PA-C [facility] ORTHOPEDIC SURGERY        Care Instructions    Physical therapy per therapist.    Off work for 2-4 weeks.    Limit shoulder use, do most things at the elbow, limit lifting to 2 lbs.     Follow up (phone or in person) before return to work.        Follow-ups after your visit        Follow-up notes from your care team     Return in about 4 weeks (around 4/13/2017).      Your next 10 appointments already scheduled     Mar 20, 2017  9:30 AM CDT   [facility] Extremity with Shiva Sylvester PT   [place] [facility] ([facility])    [address] [facility] [address]  [facility] [address] [phone]              Who to contact     If you have questions or need follow up information about today's clinic visit or your schedule please contact [facility] ORTHOPEDIC SURGERY directly at [phone].  Normal or non-critical lab and imaging results will be communicated to you by MyChart, letter or phone within 4 business days after the clinic has received the results. If you do not hear from us within 7 days, please contact the clinic through [employer] or phone. If you have a critical or abnormal lab result, we will notify you by phone as soon as possible.  Submit refill requests through [employer] or call your pharmacy and they will forward the refill request to us. Please allow 3 business days for your refill to be completed.          Additional Information About Your Visit        MyChart Information     [employer] gives you secure access to your electronic health record. If you see a primary care provider, you can also send messages to your care team and make appointments. If you have questions, please call your primary care  clinic.  If you do not have a primary care provider, please call [phone] and they will assist you.        Care EveryWhere ID     This is your Care EveryWhere ID. This could be used by other organizations to access your [place] medical records  [id number]        Your Vitals Were     Last Period                   06/15/2006            Blood Pressure from Last 3 Encounters:   02/14/17 150/84   01/25/17 154/80   09/28/16 142/80    Weight from Last 3 Encounters:   02/14/17 260 lb (117.9 kg)   01/25/17 262 lb 4.8 oz (119 kg)   09/28/16 260 lb (117.9 kg)              Today, you had the following     No orders found for display       Primary Care Provider Office Phone # Fax #    Soni Torres [phone]       [facility]   [facility] [address]        Thank you!     Thank you for choosing [facility] ORTHOPEDIC SURGERY  for your care. Our goal is always to provide you with excellent care. Hearing back from our patients is one way we can continue to improve our services. Please take a few minutes to complete the written survey that you may receive in the mail after your visit with us. Thank you!             Your Updated Medication List - Protect others around you: Learn how to safely use, store and throw away your medicines at www.disposemymeds.org.          This list is accurate as of: 3/16/17  9:38 AM.  Always use your most recent med list.                   Brand Name Dispense Instructions for use    aspirin 81 MG tablet     100 tablet    Take 1 tablet (81 mg) by mouth daily       Calcium Carb-Cholecalciferol 500-400 MG-UNIT Tabs    calcium 500 + D    270 tablet    Take 1 tablet by mouth 3 times daily       cholecalciferol 2000 UNITS Caps     90 capsule    Take 1 capsule by mouth daily       cyclobenzaprine 10 MG tablet    FLEXERIL    30 tablet    Take 1 tablet (10 mg) by mouth 3 times daily as needed for muscle spasms       erythromycin ophthalmic ointment     ROMYCIN    1 Tube    Apply to incision sites three times a day       HYDROcodone-acetaminophen 5-325 MG per tablet    NORCO    15 tablet    Take 1 tablet by mouth every 6 hours as needed       ibuprofen 600 MG tablet    ADVIL/MOTRIN     Take by mouth every 6 hours as needed       metFORMIN 500 MG 24 hr tablet    GLUCOPHAGE-XR    60 tablet    Take 2 tablets (1,000 mg) by mouth daily (with dinner)       methylPREDNISolone 4 MG tablet    MEDROL DOSEPAK    21 tablet    Follow package instructions       nabumetone 750 MG tablet    RELAFEN    30 tablet    Take 1 tablet (750 mg) by mouth daily       oxyCODONE-acetaminophen 5-325 MG per tablet    PERCOCET    40 tablet    Take 1-2 tablets by mouth every 6 hours as needed for pain (moderate to severe)       QC DAILY MULTIVITAMINS/IRON Tabs     90 tablet    Take 1 tablet by mouth daily

## 2017-03-16 NOTE — PROGRESS NOTES
HISTORY OF PRESENT ILLNESS:    Areli Stubbs is a 57 year old female who is seen in follow up for Left shoulder arthroscopic DEC/DCR, mini open RCR, DOS 2/14/2017, Dr. Miller..  Present symptoms: most pain is at night.  Sleep stil disturbed.  PRN IBuprofen or tylenol.  Tapered sling.  Heat helps. Using shoulder for ADLs.  No new complaints.  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    Denies Chest pain, Calve pain, Fever, Chills.    Current Treatment: post op, PT.     PHYSICAL EXAM:  LMP 06/15/2006  There is no height or weight on file to calculate BMI.   GENERAL APPEARANCE: healthy, alert and no distress   PSYCH:  mentation appears normal and affect normal/bright    MSK:  Left: Shoulder .  Ambulates: WNG.  Incision clean and dry, well healing.  NO incisional erythema.   No Ecchymosis.  Edema min  CMS: miky incisional numbness, otherwise grossly intact.   Per PT, PROM [address].      IMAGING INTERPRETATION:  [employer]One today.    ASSESSMENT:  Areli Stubbs is a 57 year old female S/P Left shoulder arthroscopic DEC/DCR, mini open RCR, DOS 2/14/2017, Dr. Miller.  Doing well.  Possibly overactive.    PLAN:  - Care instructions given and verbally acknowledged.  - Medications: no changes.  - Physical Therapy: PROM 4 weeks, then AROM progression starting with assisted ROM.  During Assisted ROM, Once PROM is greater than 140 degrees of Flexion, pt may progress to Strengthening as tolerated.    - limit shoulder motions.     Return to clinic 2, 4, weeks    Brian Loco PA-C    Dept. Orthopedic Surgery  [facility]   3/16/2017

## 2017-03-16 NOTE — PATIENT INSTRUCTIONS
Physical therapy per therapist.    Off work for 2-4 weeks.    Limit shoulder use, do most things at the elbow, limit lifting to 2 lbs.     Follow up (phone or in person) before return to work.

## 2017-03-20 ENCOUNTER — THERAPY VISIT (OUTPATIENT)
Dept: PHYSICAL THERAPY | Facility: CLINIC | Age: 57
End: 2017-03-20
Payer: COMMERCIAL

## 2017-03-20 DIAGNOSIS — M25.512 PAIN IN JOINT OF LEFT SHOULDER: ICD-10-CM

## 2017-03-20 DIAGNOSIS — Z47.89 AFTERCARE FOLLOWING SURGERY OF THE MUSCULOSKELETAL SYSTEM: ICD-10-CM

## 2017-03-20 PROCEDURE — 97110 THERAPEUTIC EXERCISES: CPT | Mod: GP | Performed by: PHYSICAL THERAPIST

## 2017-03-24 ENCOUNTER — TELEPHONE (OUTPATIENT)
Dept: ORTHOPEDICS | Facility: CLINIC | Age: 57
End: 2017-03-24

## 2017-03-24 NOTE — TELEPHONE ENCOUNTER
Patient calls stating [employer] has not received updated paperwork from last appointment 3/16/17. Informed we have not received. She states they state they have faxed it twice. Gave her our fax number to confirm with them that they are sending to correct number.     She states she was originally scheduled to go back to work on 3/21/17 and that was extended at her last visit. She states she is doing well and would like to go back to work on 4/3/17 and is anxious to return. 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 Patient had left arthroscopic DEC/DCR, mini open RCR on 2/14/17 by Dr. Miller.     Consulted with Obdulio Loco PA-C /Plan : OK to return to work on 4/3/17, 5lb weight restriction with left arm, no lifting left arm overhead, allow continued physical therapy as medically necessity.     Will await forms.     JUSTO Gaming RN

## 2017-03-24 NOTE — TELEPHONE ENCOUNTER
Our goal is to complete and return forms within 5-7 business days of receiving the request.    Type of form Requested: APS  Form requested by (include company):   [employer]    Phone number for requestor: [phone]  Date form is requested by: asap    How will form be returned?:  fax to [phone]  Has the patient signed a consent form for release of information (may be included with form)? N/A  Additional comment: also asking for office visit notes from 3/1/17 to present.    Forms and office visit notes faxed to number above.   Left voicemail for patient informing that forms received and faxed back with office visit notes requested.     JUSTO Gaming RN

## 2017-03-24 NOTE — TELEPHONE ENCOUNTER
Patient left message asking for a call back to let her know if we received paperwork from [employer].     Phone call to patient and informed we did not receive anything from [employer]. It is possible that it could have mistakenly gone to medical records but has not been scanned yet. She states she will call Eulalio on Monday. Otherwise she has a physical therapy appointment on 3/28/17 and can bring blank forms that day with her.     Phone call to Eulalio Cody. Informed we are not receiving fax. He states in addition to APS they are asking for all medical records 3/1/17 to present. Informed that request is probably being sent to medical records then and getting mixed in with that. Asked that he re-fax to writer's attention. He states we should receive in the next 2 hrs.     Once APS received, can complete based on Obdulio' recommendations below and route request for medical records to HIM.     Please watch for form.     JUSTO Gaming RN

## 2017-03-28 ENCOUNTER — THERAPY VISIT (OUTPATIENT)
Dept: PHYSICAL THERAPY | Facility: CLINIC | Age: 57
End: 2017-03-28
Payer: COMMERCIAL

## 2017-03-28 DIAGNOSIS — M25.512 PAIN IN JOINT OF LEFT SHOULDER: ICD-10-CM

## 2017-03-28 DIAGNOSIS — Z47.89 AFTERCARE FOLLOWING SURGERY OF THE MUSCULOSKELETAL SYSTEM: ICD-10-CM

## 2017-03-28 PROCEDURE — 97110 THERAPEUTIC EXERCISES: CPT | Mod: GP | Performed by: PHYSICAL THERAPIST

## 2017-03-28 PROCEDURE — 97140 MANUAL THERAPY 1/> REGIONS: CPT | Mod: GP | Performed by: PHYSICAL THERAPIST

## 2017-04-03 NOTE — TELEPHONE ENCOUNTER
Patient left voicemail that she needs a release to return to work  faxed to her employer today.     Phone call to patient. Forms were faxed to [employer] but she needs them for employer also. She asks that we fax directly to her and she will give to her supervisor.   Fax: [phone].     Forms faxed to number above. Copy sent to scan as do not see they have been scanned yet, but may be in process.     JUSTO Gaming, RN

## 2017-04-12 ENCOUNTER — THERAPY VISIT (OUTPATIENT)
Dept: PHYSICAL THERAPY | Facility: CLINIC | Age: 57
End: 2017-04-12
Payer: COMMERCIAL

## 2017-04-12 DIAGNOSIS — M25.512 PAIN IN JOINT OF LEFT SHOULDER: ICD-10-CM

## 2017-04-12 DIAGNOSIS — Z47.89 AFTERCARE FOLLOWING SURGERY OF THE MUSCULOSKELETAL SYSTEM: ICD-10-CM

## 2017-04-12 PROCEDURE — 97140 MANUAL THERAPY 1/> REGIONS: CPT | Mod: GP | Performed by: PHYSICAL THERAPIST

## 2017-04-12 PROCEDURE — 97110 THERAPEUTIC EXERCISES: CPT | Mod: GP | Performed by: PHYSICAL THERAPIST

## 2017-04-17 ENCOUNTER — THERAPY VISIT (OUTPATIENT)
Dept: PHYSICAL THERAPY | Facility: CLINIC | Age: 57
End: 2017-04-17
Payer: COMMERCIAL

## 2017-04-17 DIAGNOSIS — Z47.89 AFTERCARE FOLLOWING SURGERY OF THE MUSCULOSKELETAL SYSTEM: ICD-10-CM

## 2017-04-17 DIAGNOSIS — M25.512 PAIN IN JOINT OF LEFT SHOULDER: ICD-10-CM

## 2017-04-17 PROCEDURE — 97110 THERAPEUTIC EXERCISES: CPT | Mod: GP | Performed by: PHYSICAL THERAPIST

## 2017-04-17 PROCEDURE — 97140 MANUAL THERAPY 1/> REGIONS: CPT | Mod: GP | Performed by: PHYSICAL THERAPIST

## 2017-04-20 ENCOUNTER — TELEPHONE (OUTPATIENT)
Dept: FAMILY MEDICINE | Facility: CLINIC | Age: 57
End: 2017-04-20

## 2017-04-26 ENCOUNTER — THERAPY VISIT (OUTPATIENT)
Dept: PHYSICAL THERAPY | Facility: CLINIC | Age: 57
End: 2017-04-26
Payer: COMMERCIAL

## 2017-04-26 DIAGNOSIS — M25.512 PAIN IN JOINT OF LEFT SHOULDER: ICD-10-CM

## 2017-04-26 DIAGNOSIS — Z47.89 AFTERCARE FOLLOWING SURGERY OF THE MUSCULOSKELETAL SYSTEM: ICD-10-CM

## 2017-04-26 PROCEDURE — 97140 MANUAL THERAPY 1/> REGIONS: CPT | Mod: GP | Performed by: PHYSICAL THERAPIST

## 2017-04-26 PROCEDURE — 97110 THERAPEUTIC EXERCISES: CPT | Mod: GP | Performed by: PHYSICAL THERAPIST

## 2017-05-05 ENCOUNTER — THERAPY VISIT (OUTPATIENT)
Dept: PHYSICAL THERAPY | Facility: CLINIC | Age: 57
End: 2017-05-05
Payer: COMMERCIAL

## 2017-05-05 DIAGNOSIS — M25.512 PAIN IN JOINT OF LEFT SHOULDER: ICD-10-CM

## 2017-05-05 DIAGNOSIS — Z47.89 AFTERCARE FOLLOWING SURGERY OF THE MUSCULOSKELETAL SYSTEM: ICD-10-CM

## 2017-05-05 PROCEDURE — 97110 THERAPEUTIC EXERCISES: CPT | Mod: GP | Performed by: PHYSICAL THERAPIST

## 2017-05-05 PROCEDURE — 97140 MANUAL THERAPY 1/> REGIONS: CPT | Mod: GP | Performed by: PHYSICAL THERAPIST

## 2017-05-05 NOTE — MR AVS SNAPSHOT
After Visit Summary   5/5/2017    Areli Stubbs    MRN: [id number]           Patient Information     Date Of Birth          1960        Visit Information        Provider Department      5/5/2017 7:00 AM Shiva Sylvester, PT [facility] Physical Therapy        Today's Diagnoses     Pain in joint of left shoulder        Aftercare following surgery of the musculoskeletal system           Follow-ups after your visit        Your next 10 appointments already scheduled     May 13, 2017  9:20 AM CDT   CINDY Extremity with Thao Doherty PT   [facility] Physical Therapy ([facility])    [address]  [facility] [id number] [phone]              Who to contact     If you have questions or need follow up information about today's clinic visit or your schedule please contact [facility] ATHLETIC [facility] PHYSICAL THERAPY directly at [phone].  Normal or non-critical lab and imaging results will be communicated to you by [employer], letter or phone within 4 business days after the clinic has received the results. If you do not hear from us within 7 days, please contact the clinic through [employer] or phone. If you have a critical or abnormal lab result, we will notify you by phone as soon as possible.  Submit refill requests through [employer] or call your pharmacy and they will forward the refill request to us. Please allow 3 business days for your refill to be completed.          Additional Information About Your Visit        [employer] Information     [employer] gives you secure access to your electronic health record. If you see a primary care provider, you can also send messages to your care team and make appointments. If you have questions, please call your primary care clinic.  If you do not have a primary care provider, please call [phone] and they will assist you.        Care EveryWhere ID      This is your Care EveryWhere ID. This could be used by other organizations to access your [place] medical records  [id number]        Your Vitals Were     Last Period                   06/15/2006            Blood Pressure from Last 3 Encounters:   02/14/17 150/84   01/25/17 154/80   09/28/16 142/80    Weight from Last 3 Encounters:   02/14/17 117.9 kg (260 lb)   01/25/17 119 kg (262 lb 4.8 oz)   09/28/16 117.9 kg (260 lb)              We Performed the Following     MANUAL THER TECH,1+REGIONS,EA 15 MIN     THERAPEUTIC EXERCISES        Primary Care Provider Office Phone # Fax #    Soni Torres [phone]       [facility]   [facility] [address]        Thank you!     Thank you for choosing [facility] A[facility]LETIC [facility] PHYSICAL THERAPY  for your care. Our goal is always to provide you with excellent care. Hearing back from our patients is one way we can continue to improve our services. Please take a few minutes to complete the written survey that you may receive in the mail after your visit with us. Thank you!             Your Updated Medication List - Protect others around you: Learn how to safely use, store and throw away your medicines at www.disposemymeds.org.          This list is accurate as of: 5/5/17  7:48 AM.  Always use your most recent med list.                   Brand Name Dispense Instructions for use    aspirin 81 MG tablet     100 tablet    Take 1 tablet (81 mg) by mouth daily       Calcium Carb-Cholecalciferol 500-400 MG-UNIT Tabs    calcium 500 + D    270 tablet    Take 1 tablet by mouth 3 times daily       cholecalciferol 2000 UNITS Caps     90 capsule    Take 1 capsule by mouth daily       cyclobenzaprine 10 MG tablet    FLEXERIL    30 tablet    Take 1 tablet (10 mg) by mouth 3 times daily as needed for muscle spasms       erythromycin ophthalmic ointment    ROMYCIN    1 Tube    Apply to incision sites three times a day        HYDROcodone-acetaminophen 5-325 MG per tablet    NORCO    15 tablet    Take 1 tablet by mouth every 6 hours as needed       ibuprofen 600 MG tablet    ADVIL/MOTRIN     Take by mouth every 6 hours as needed       metFORMIN 500 MG 24 hr tablet    GLUCOPHAGE-XR    60 tablet    Take 2 tablets (1,000 mg) by mouth daily (with dinner)       methylPREDNISolone 4 MG tablet    MEDROL DOSEPAK    21 tablet    Follow package instructions       nabumetone 750 MG tablet    RELAFEN    30 tablet    Take 1 tablet (750 mg) by mouth daily       oxyCODONE-acetaminophen 5-325 MG per tablet    PERCOCET    40 tablet    Take 1-2 tablets by mouth every 6 hours as needed for pain (moderate to severe)       QC DAILY MULTIVITAMINS/IRON Tabs     90 tablet    Take 1 tablet by mouth daily

## 2017-07-05 NOTE — PROGRESS NOTES
Discharge Note    Progress reporting period is from initial eval to May 5, 2017.     Areli failed to return for next follow up visit and current status is unknown.  Please see information below for last relevant information on current status.  Patient seen for Rxs Used: 7 visits.  SUBJECTIVE  Subjective changes noted by patient:  Subjective: doing more but still progressing slowly  .  Current pain level is Current Pain level: 3/10.     Previous pain level was  Initial Pain level: 6/10.   Changes in function:  Yes (See Goal flowsheet attached for changes in current functional level)  Adverse reaction to treatment or activity: None    OBJECTIVE  Changes noted in objective findings: Objective: AROM: flex 145, abd 135, ER 70     ASSESSMENT/PLAN  Diagnosis: S/P L shoulder decompression/RCR   DIAGP:  Diagnoses of Pain in joint of left shoulder and Aftercare following surgery of the musculoskeletal system were pertinent to this visit.  Updated problem list and treatment plan:   Pain - HEP  Decreased ROM/flexibility - HEP  Decreased strength - HEP  Impaired muscle performance - HEP  STG/LTGs have been met or progress has been made towards goals:  Yes, please see goal flowsheet for most current information  Assessment of Progress: current status is unknown.  Last current status: Assessment of progress: Pt is progressing as expected   Self Management Plans:  HEP  I have re-evaluated this patient and find that the nature, scope, duration and intensity of the therapy is appropriate for the medical condition of the patient.  Areli continues to require the following intervention to meet STG and LTG's:  HEP.    Recommendations:  Discharge with current home program.  Patient to follow up with MD as needed.    Please refer to the daily flowsheet for treatment today, total treatment time and time spent performing 1:1 timed codes.

## 2017-07-10 ENCOUNTER — TELEPHONE (OUTPATIENT)
Dept: FAMILY MEDICINE | Facility: CLINIC | Age: 57
End: 2017-07-10

## 2017-07-10 NOTE — TELEPHONE ENCOUNTER
Panel Management Review      Patient has the following on her problem list:     Depression / Dysthymia review  PHQ-9 SCORE 11/19/2014 3/28/2016 1/25/2017   Total Score 5 - -   Total Score - 6 4      Patient is due for:  PHQ9 and DAP    Diabetes    ASA: Passed    Last A1C  Lab Results   Component Value Date    A1C 6.2 01/25/2017    A1C 8.3 03/28/2016    A1C 7.5 09/02/2015    A1C 6.6 08/20/2013    A1C 8.8 06/25/2013     A1C tested: Passed    Last LDL:    Lab Results   Component Value Date    CHOL 197 03/28/2016     Lab Results   Component Value Date    HDL 39 03/28/2016     Lab Results   Component Value Date    LDL 94 03/28/2016     Lab Results   Component Value Date    TRIG 322 03/28/2016     Lab Results   Component Value Date    CHOLHDLRATIO 5.4 06/25/2013     Lab Results   Component Value Date    NHDL 158 03/28/2016       Is the patient on a Statin? NO             Is the patient on Aspirin? YES    Medications     Salicylates    aspirin 81 MG tablet          Last three blood pressure readings:  BP Readings from Last 3 Encounters:   02/14/17 150/84   01/25/17 154/80   09/28/16 142/80       Date of last diabetes office visit: 6/25/13     Tobacco History:     History   Smoking Status     Former Smoker     Packs/day: 0.50     Types: Cigarettes     Quit date: 6/13/2013   Smokeless Tobacco     Never Used     Comment: quit 6/13/13         Hypertension   Last three blood pressure readings:  BP Readings from Last 3 Encounters:   02/14/17 150/84   01/25/17 154/80   09/28/16 142/80     Blood pressure: FAILED    HTN Guidelines:  Age 18-59 BP range:  Less than 140/90  Age 60-85 with Diabetes:  Less than 140/90  Age 60-85 without Diabetes:  less than 150/90      Composite cancer screening  Chart review shows that this patient is due/due soon for the following Fecal Colorectal (FIT)  Summary:    Patient is due/failing the following:   BP CHECK, DAP, FIT, PHQ9 and STATIN    Action needed:   Routed to provider for review.    Type  of outreach:    None, routed to provider for review.    Questions for provider review:    Patient is not on a statin                                                                                                                                    Arthur Vanegas [facility]        Chart routed to Provider .

## 2017-07-10 NOTE — LETTER
[facility]  [address] [place], [address]  [phone]        July 10, 2017    Areli Stubbs                                                                                                                                                       [address] [place] [id number]            Dear Areli,  Just a brief note to remind you to get your diabetes tests and colon screening if you haven't already done so. You can do the FIT occult blood test or the colonoscopy.      hope that your orthopedic therapy is coming along well for you.         Ruben Reich MD

## 2018-09-19 ENCOUNTER — TRANSFERRED RECORDS (OUTPATIENT)
Dept: HEALTH INFORMATION MANAGEMENT | Facility: CLINIC | Age: 58
End: 2018-09-19

## 2018-09-19 LAB
CREAT SERPL-MCNC: 0.8 MG/DL (ref 0.5–1)
GFR SERPL CREATININE-BSD FRML MDRD: >60 ML/MIN/1.73M2
GLUCOSE SERPL-MCNC: 217 MG/DL (ref 74–106)
POTASSIUM SERPL-SCNC: 3.8 MMOL/L (ref 3.5–5)

## 2018-10-09 ENCOUNTER — TRANSFERRED RECORDS (OUTPATIENT)
Dept: HEALTH INFORMATION MANAGEMENT | Facility: CLINIC | Age: 58
End: 2018-10-09

## 2019-01-09 ENCOUNTER — OFFICE VISIT (OUTPATIENT)
Dept: FAMILY MEDICINE | Facility: CLINIC | Age: 59
End: 2019-01-09
Payer: COMMERCIAL

## 2019-01-09 VITALS
WEIGHT: 262 LBS | TEMPERATURE: 98.5 F | HEIGHT: 65 IN | HEART RATE: 80 BPM | SYSTOLIC BLOOD PRESSURE: 142 MMHG | DIASTOLIC BLOOD PRESSURE: 78 MMHG | BODY MASS INDEX: 43.65 KG/M2 | RESPIRATION RATE: 16 BRPM

## 2019-01-09 DIAGNOSIS — Z13.89 SCREENING FOR DIABETIC PERIPHERAL NEUROPATHY: ICD-10-CM

## 2019-01-09 DIAGNOSIS — E66.01 OBESITY, CLASS III, BMI 40-49.9 (MORBID OBESITY) (H): ICD-10-CM

## 2019-01-09 DIAGNOSIS — E11.9 TYPE 2 DIABETES MELLITUS WITHOUT COMPLICATION, WITHOUT LONG-TERM CURRENT USE OF INSULIN (H): Primary | ICD-10-CM

## 2019-01-09 DIAGNOSIS — F32.0 MILD MAJOR DEPRESSION (H): ICD-10-CM

## 2019-01-09 DIAGNOSIS — M25.552 HIP PAIN, LEFT: ICD-10-CM

## 2019-01-09 DIAGNOSIS — Z23 NEED FOR PROPHYLACTIC VACCINATION AND INOCULATION AGAINST INFLUENZA: ICD-10-CM

## 2019-01-09 LAB — HBA1C MFR BLD: 10.7 % (ref 0–5.6)

## 2019-01-09 PROCEDURE — 99207 C FOOT EXAM  NO CHARGE: CPT | Mod: 25 | Performed by: PHYSICIAN ASSISTANT

## 2019-01-09 PROCEDURE — 82043 UR ALBUMIN QUANTITATIVE: CPT | Performed by: PHYSICIAN ASSISTANT

## 2019-01-09 PROCEDURE — 80061 LIPID PANEL: CPT | Performed by: PHYSICIAN ASSISTANT

## 2019-01-09 PROCEDURE — 99214 OFFICE O/P EST MOD 30 MIN: CPT | Mod: 25 | Performed by: PHYSICIAN ASSISTANT

## 2019-01-09 PROCEDURE — 90682 RIV4 VACC RECOMBINANT DNA IM: CPT | Performed by: PHYSICIAN ASSISTANT

## 2019-01-09 PROCEDURE — 83036 HEMOGLOBIN GLYCOSYLATED A1C: CPT | Performed by: PHYSICIAN ASSISTANT

## 2019-01-09 PROCEDURE — 90471 IMMUNIZATION ADMIN: CPT | Performed by: PHYSICIAN ASSISTANT

## 2019-01-09 PROCEDURE — 84443 ASSAY THYROID STIM HORMONE: CPT | Performed by: PHYSICIAN ASSISTANT

## 2019-01-09 PROCEDURE — 36415 COLL VENOUS BLD VENIPUNCTURE: CPT | Performed by: PHYSICIAN ASSISTANT

## 2019-01-09 RX ORDER — LISINOPRIL 10 MG/1
10 TABLET ORAL DAILY
Qty: 90 TABLET | Refills: 3 | Status: SHIPPED | OUTPATIENT
Start: 2019-01-09 | End: 2019-09-04

## 2019-01-09 RX ORDER — ATORVASTATIN CALCIUM 10 MG/1
10 TABLET, FILM COATED ORAL DAILY
Qty: 90 TABLET | Refills: 3 | Status: SHIPPED | OUTPATIENT
Start: 2019-01-09 | End: 2020-04-08

## 2019-01-09 RX ORDER — GLIPIZIDE AND METFORMIN HCL 2.5; 5 MG/1; MG/1
1 TABLET, FILM COATED ORAL
Qty: 180 TABLET | Refills: 3 | Status: SHIPPED | OUTPATIENT
Start: 2019-01-09 | End: 2019-01-14

## 2019-01-09 ASSESSMENT — ANXIETY QUESTIONNAIRES
IF YOU CHECKED OFF ANY PROBLEMS ON THIS QUESTIONNAIRE, HOW DIFFICULT HAVE THESE PROBLEMS MADE IT FOR YOU TO DO YOUR WORK, TAKE CARE OF THINGS AT HOME, OR GET ALONG WITH OTHER PEOPLE: SOMEWHAT DIFFICULT
7. FEELING AFRAID AS IF SOMETHING AWFUL MIGHT HAPPEN: MORE THAN HALF THE DAYS
5. BEING SO RESTLESS THAT IT IS HARD TO SIT STILL: SEVERAL DAYS
1. FEELING NERVOUS, ANXIOUS, OR ON EDGE: MORE THAN HALF THE DAYS
2. NOT BEING ABLE TO STOP OR CONTROL WORRYING: NEARLY EVERY DAY
6. BECOMING EASILY ANNOYED OR IRRITABLE: MORE THAN HALF THE DAYS
GAD7 TOTAL SCORE: 16
3. WORRYING TOO MUCH ABOUT DIFFERENT THINGS: NEARLY EVERY DAY

## 2019-01-09 ASSESSMENT — MIFFLIN-ST. JEOR: SCORE: 1779.3

## 2019-01-09 ASSESSMENT — PATIENT HEALTH QUESTIONNAIRE - PHQ9
SUM OF ALL RESPONSES TO PHQ QUESTIONS 1-9: 12
5. POOR APPETITE OR OVEREATING: NEARLY EVERY DAY

## 2019-01-09 NOTE — PATIENT INSTRUCTIONS
Patient Education     Eating Heart-Healthy Food: Using the DASH Plan    Eating for your heart doesn t have to be hard or boring. You just need to know how to make healthier choices. The DASH eating plan has been developed to help you do just that. DASH stands for Dietary Approaches to Stop Hypertension. It is a plan that has been proven to be healthier for your heart and to lower your risk for high blood pressure. It can also help lower your risk for cancer, heart disease, osteoporosis, and diabetes.  Choosing from each food group  Choose foods from each of the food groups below each day. Try to get the recommended number of servings for each food group. The serving numbers are based on a diet of 2,000 calories a day. Talk to your doctor if you re unsure about your calorie needs. Along with getting the correct servings, the DASH plan also recommends a sodium intake less than 2,300 mg per day.        Grains  Servings: 6 to 8 a day  A serving is:    1 slice bread    1 ounce dry cereal    Half a cup cooked rice, pasta or cereal  Best choices: Whole grains and any grains high in fiber. Vegetables  Servings: 4 to 5 a day  A serving is:    1 cup raw leafy vegetable    Half a cup cut-up raw or cooked vegetable    Half a cup vegetable juice  Best choices: Fresh or frozen vegetables prepared without added salt or fat.   Fruits  Servings: 4 to 5 a day  A serving is:    1 medium fruit    One-quarter cup dried fruit    Half a cup fresh, frozen, or canned fruit    Half a cup of 100% fruit juices  Best choices: A variety of fresh fruits of different colors. Whole fruits are a better choice than fruit juices. Low-fat or fat-free dairy  Servings: 2 to 3 a day  A serving is:    1 cup milk    1 cup yogurt    One and a half ounces cheese  Best choices: Skim or 1% milk, low-fat or fat-free yogurt or buttermilk, and low-fat cheeses.         Lean meats, poultry, fish  Servings: 6 or fewer a day  A serving is:    1 ounce cooked meats,  poultry, or fish    1 egg  Best choices: Lean poultry and fish. Trim away visible fat. Broil, grill, roast, or boil instead of frying. Remove skin from poultry before eating. Limit how much red meat you eat.  Nuts, seeds, beans  Servings: 4 to 5 a week  A serving is:    One-third cup nuts (one and a half ounces)    2 tablespoons nut butter or seeds    Half a cup cooked dry beans or legumes  Best choices: Dry roasted nuts with no salt added, lentils, kidney beans, garbanzo beans, and whole yeboah beans.   Fats and oils  Servings: 2 to 3 a day  A serving is:    1 teaspoon vegetable oil    1 teaspoon soft margarine    1 tablespoon mayonnaise    2 tablespoons salad dressing  Best choices: Nut and vegetable oils (nontropical vegetable oils), such as olive and canola oil. Sweets  Servings: 5 a week or fewer  A serving is:    1 tablespoon sugar, maple syrup, or honey    1 tablespoon jam or jelly    1 half-ounce jelly beans (about 15)    1 cup lemonade  Best choices: Dried fruit can be a satisfying sweet. Choose low-fat sweets. And watch your serving sizes!      For more on the DASH eating plan, visit:  www.nhlbi.nih.gov/health/health-topics/topics/dash   Date Last Reviewed: 6/1/2016 2000-2018 [employer]. [address] [place] [address], [place] [address]. All rights reserved. This information is not intended as a substitute for professional medical care. Always follow your healthcare professional's instructions.      Mediterranean Diet.

## 2019-01-09 NOTE — PROGRESS NOTES
SUBJECTIVE:   Areli Stubbs is a 58 year old female who presents to clinic today for the following health issues:      History of Present Illness     Diabetes:     Frequency of checking blood sugars::  Not at all    Diabetic concerns::  Other    Hypoglycemia symptoms::  Other    Paraesthesia present::  No    Eye Exam in the last year::  Yes    02/2018    Diabetes Management Resources    New patient to the clinic here to discuss several issues. Some previous  She is a type 2 diabetic and has had a little confusion with her medications recently.  She says last Rx was not called in correctly and she is currently taking 3 tabs of 750 mg XR metformin.  She says she has had stomach problems with this in the past including diarrhea but not lately.  She has not tried any other medications for her diabetes.  She says her A1C typically runs in the 6's.  She is interested in trying something new.  She mentioned Jardiance.    Diabetes Follow-up      Patient is checking blood sugars: not at all    Diabetic concerns: None     Symptoms of hypoglycemia (low blood sugar): none     Paresthesias (numbness or burning in feet) or sores: No     Date of last diabetic eye exam: ?    Diabetes Management Resources      Hyperlipidemia Follow-Up      Rate your low fat/cholesterol diet?: fair    Taking statin?  No    Other lipid medications/supplements?:  none    She has never been on cholesterol medication nor told she should be due to diabetes.    Hypertension Follow-up      Outpatient blood pressures are not being checked.    Low Salt Diet: not monitoring salt    BP Readings from Last 2 Encounters:   01/09/19 142/78   02/14/17 150/84     Hemoglobin A1C (%)   Date Value   01/25/2017 6.2 (H)   03/28/2016 8.3 (H)     LDL Cholesterol Calculated (mg/dL)   Date Value   03/28/2016 94   06/25/2013 106     Has not been on an ACE inhibitor either, nor told she should be on one.      Weight- she says that while she is currently happy with her weight  "she has considered bariatric weight loss surgery.  She [facility] with [facility] but did not complete.  Her current provider has been encouraging her to consider a bariatric program again.    Mood- history of depression.  Says mood is ok now.  Has been on wellbutrin in the past, not currently taking anything.    Problem list and histories reviewed & adjusted, as indicated.  Additional history: as documented      Labs reviewed in EPIC    ROS:  Constitutional, HEENT, cardiovascular, pulmonary, gi and gu systems are negative, except as otherwise noted.    OBJECTIVE:     /78 (BP Location: Right arm, Patient Position: Sitting, Cuff Size: Adult Large)   Pulse 80   Temp 98.5  F (36.9  C) (Oral)   Resp 16   Ht 1.651 m (5' 5\")   Wt 118.8 kg (262 lb)   LMP 06/15/2006   BMI 43.60 kg/m    Body mass index is 43.6 kg/m .  GENERAL: healthy, alert and no distress  CV: regular rate and rhythm, normal S1 S2, no S3 or S4, no murmur, click or rub, no peripheral edema and peripheral pulses strong  MS: no gross musculoskeletal defects noted, no edema  SKIN: no suspicious lesions or rashes  NEURO: Normal strength and tone, mentation intact and speech normal  PSYCH: mentation appears normal, affect normal/bright  Diabetic foot exam: normal DP and PT pulses, no trophic changes or ulcerative lesions and normal sensory exam    Diagnostic Test Results:  Results for orders placed or performed in visit on 01/09/19   HEMOGLOBIN A1C   Result Value Ref Range    Hemoglobin A1C 10.7 (H) 0 - 5.6 %   Lipid panel reflex to direct LDL Fasting   Result Value Ref Range    Cholesterol 173 <200 mg/dL    Triglycerides 292 (H) <150 mg/dL    HDL Cholesterol 38 (L) >49 mg/dL    LDL Cholesterol Calculated 77 <100 mg/dL    Non HDL Cholesterol 135 (H) <130 mg/dL   Albumin Random Urine Quantitative with Creat Ratio   Result Value Ref Range    Creatinine Urine 128 mg/dL    Albumin Urine mg/L 11 mg/L    Albumin Urine mg/g Cr 8.52 0 " "- 25 mg/g Cr   TSH WITH FREE T4 REFLEX   Result Value Ref Range    TSH 2.50 0.40 - 4.00 mU/L       ASSESSMENT/PLAN:   1. Type 2 diabetes mellitus without complication, without long-term current use of insulin (H)  Will check labs today.  She says she has a \"bunch\" of Metformin 750mg at home.  I recommended that she stay on TWO of these equalling 1500mg total at night.  Will add Metaglip 500-250mg daily.  Will see how A1C returns and decide from there about what to add, if anything.    - HEMOGLOBIN A1C  - Lipid panel reflex to direct LDL Fasting  - Albumin Random Urine Quantitative with Creat Ratio  - TSH WITH FREE T4 REFLEX  - atorvastatin (LIPITOR) 10 MG tablet; Take 1 tablet (10 mg) by mouth daily  Dispense: 90 tablet; Refill: 3  - lisinopril (PRINIVIL/ZESTRIL) 10 MG tablet; Take 1 tablet (10 mg) by mouth daily  Dispense: 90 tablet; Refill: 3    2. Obesity, Class III, BMI 40-49.9 (morbid obesity) (H)  Highly encouraged her to consider going back to the medical or bariatric weight loss clinic.    3. Mild major depression (H)  PHQ-9 SCORE 3/28/2016 1/25/2017 1/9/2019   PHQ-9 Total Score - - -   PHQ-9 Total Score 6 4 12     Currently controlled.  - TSH WITH FREE T4 REFLEX    4. Screening for diabetic peripheral neuropathy    - FOOT EXAM  NO CHARGE [14861.114]    5. Need for prophylactic vaccination and inoculation against influenza    - Vaccine Administration, Initial [33805]    6. Hip pain, left  Last minute she added how much her left hip has bothered her for years.  Will place referral for her.  - ORTHO  REFERRAL    Plan for 3 month follow up, pending labs.      Elliott Askew PA-C  [facility]  Injectable Influenza Immunization Documentation    1.  Is the person to be vaccinated sick today?   No    2. Does the person to be vaccinated have an allergy to a component   of the vaccine?   No  Egg Allergy Algorithm Link    3. Has the person to be vaccinated ever had a serious reaction "   to influenza vaccine in the past?   No    4. Has the person to be vaccinated ever had Guillain-Barré syndrome?   No    Form completed by patient

## 2019-01-10 ENCOUNTER — TELEPHONE (OUTPATIENT)
Dept: FAMILY MEDICINE | Facility: CLINIC | Age: 59
End: 2019-01-10

## 2019-01-10 LAB
CHOLEST SERPL-MCNC: 173 MG/DL
CREAT UR-MCNC: 128 MG/DL
HDLC SERPL-MCNC: 38 MG/DL
LDLC SERPL CALC-MCNC: 77 MG/DL
MICROALBUMIN UR-MCNC: 11 MG/L
MICROALBUMIN/CREAT UR: 8.52 MG/G CR (ref 0–25)
NONHDLC SERPL-MCNC: 135 MG/DL
TRIGL SERPL-MCNC: 292 MG/DL
TSH SERPL DL<=0.005 MIU/L-ACNC: 2.5 MU/L (ref 0.4–4)

## 2019-01-10 RX ORDER — METFORMIN HYDROCHLORIDE 750 MG/1
2250 TABLET, EXTENDED RELEASE ORAL
Qty: 180 TABLET | Refills: 3 | COMMUNITY
Start: 2019-01-10 | End: 2020-04-08

## 2019-01-10 RX ORDER — BLOOD-GLUCOSE METER
EACH MISCELLANEOUS
Qty: 1 KIT | Refills: 0 | Status: SHIPPED | OUTPATIENT
Start: 2019-01-10 | End: 2020-01-10

## 2019-01-10 RX ORDER — GLUCOSAMINE HCL/CHONDROITIN SU 500-400 MG
CAPSULE ORAL
Qty: 100 EACH | Refills: 3 | Status: SHIPPED | OUTPATIENT
Start: 2019-01-10 | End: 2019-09-16

## 2019-01-10 ASSESSMENT — ANXIETY QUESTIONNAIRES: GAD7 TOTAL SCORE: 16

## 2019-01-10 NOTE — TELEPHONE ENCOUNTER
She should still take the TWO tabs of her 750mg Metformin as we discussed yesterday.  Then I do want her to  the new one and start taking it.      I am still in the process of deciding what OTHER agent we will add next.    She asked about Jardiance.  I am planning to ask Brian Del Rio about this.  If this is not an option I will consider something along the lines of Victoza if she is agreeable to an injectable.      Thanks!  Elliott

## 2019-01-10 NOTE — TELEPHONE ENCOUNTER
Patient was in yesterday to see Elliott Askew and called today asking if she can put in a Rx for :      Diabetic Glucose Monitor - The One Touch Ultra 2 - Monitor brand    As well as the Lancets, testing strips.    Has to be that brand, is covered under her insurance.     Patient would like these sent to the [facility] in [place]     Batoolcolby Anderson/ROXANN

## 2019-01-10 NOTE — TELEPHONE ENCOUNTER
Pt calling back after receiving my chart message about elevated A1c    She has not yet picked up the Metaglip as she is unsure if she is to take that or what medications she should be taking now.     All medications can be sent to [facility]    Ok to send pt my chart with information or call [phone]    Geetha Nassar RN

## 2019-01-11 NOTE — TELEPHONE ENCOUNTER
I heard back from Brian, pharmacist in [place].  Can we see if she would be willing to see him if her insurance covers?  I think she would be a good candidate for this.  I will place referral for her now.    Thanks,  Elliott

## 2019-01-14 ENCOUNTER — OFFICE VISIT (OUTPATIENT)
Dept: PHARMACY | Facility: CLINIC | Age: 59
End: 2019-01-14
Payer: COMMERCIAL

## 2019-01-14 VITALS
WEIGHT: 260.8 LBS | OXYGEN SATURATION: 97 % | DIASTOLIC BLOOD PRESSURE: 76 MMHG | HEART RATE: 87 BPM | SYSTOLIC BLOOD PRESSURE: 138 MMHG | BODY MASS INDEX: 43.4 KG/M2

## 2019-01-14 DIAGNOSIS — E11.65 UNCONTROLLED TYPE 2 DIABETES MELLITUS WITH HYPERGLYCEMIA (H): Primary | ICD-10-CM

## 2019-01-14 DIAGNOSIS — I10 HTN, GOAL BELOW 140/90: ICD-10-CM

## 2019-01-14 DIAGNOSIS — M25.552 HIP PAIN, LEFT: ICD-10-CM

## 2019-01-14 DIAGNOSIS — E78.5 HYPERLIPIDEMIA LDL GOAL <160: ICD-10-CM

## 2019-01-14 PROCEDURE — 99607 MTMS BY PHARM ADDL 15 MIN: CPT | Performed by: PHARMACIST

## 2019-01-14 PROCEDURE — 99605 MTMS BY PHARM NP 15 MIN: CPT | Performed by: PHARMACIST

## 2019-01-14 NOTE — PATIENT INSTRUCTIONS
Recommendations from today's MTM visit:                                                    MTM (medication therapy management) is a service provided by a clinical pharmacist designed to help you get the most of out of your medicines.   Today we reviewed what your medicines are for, how to know if they are working, that your medicines are safe and how to make your medicine regimen as easy as possible.     1. FYI--A1c is 10.7% --too high -Goal is 7% or less.   Please stay on 3 --750mg. Metformin daily, read my 1 page low -carbohydrate diey handout --strive for less carbs, eat more non-starchy veggies--like bruBiometryCloud sprouts , drink  ounces of non -calorie liquid /day and give my intermittent fasting diet a try --that means non caloric only for 12 hours overnight --like 8pm -8am .     Please check blood sugars twice daily :  Fasting in AM before breakfast (goal is ) , and 2 hours after dinner -Goal then is <150.     Lets also ADD in once weekly Ozempic 0.25mg./week . First shot  in office today . Congrats for successful first injection.     2. FYI--use your TENS unit for hip pain , also try icyhot patches with 4% lidocaine.         Next MTM visit:  4 weeks with blood sugar meter -- See me Monday February 11th -2019 at 4;30 pm.     To schedule another MTM appointment, please call the clinic directly or you may call the MTM scheduling line at [phone] or toll-free at [phone].     My Clinical Pharmacist's contact information:                                                      It was a pleasure talking with you today!  Please feel free to contact me with any questions or concerns you have.      Brian Del Rio [facility].  Medication Therapy Management Provider  [phone]      You may receive a survey about the MTM services you received.  I would appreciate your feedback to help me serve you better in the future. Please fill it out and return it when you can. Your comments will be anonymous.

## 2019-01-14 NOTE — Clinical Note
dylan--thanks for mtm referral --I did not start her on glip-met today --added ozempic instead to see if we can get weight loss and better bs control.She will be back in 4 weeks for bs recheck.[email or web address]

## 2019-01-14 NOTE — PROGRESS NOTES
SUBJECTIVE/OBJECTIVE:                           Areli Stubbs is a 58 year old female coming in for an initial visit for Medication Therapy Management.  She was referred to me from her pcp Elliott duenas .          Chief Complaint:  Here for DM consult. [facility] , reduced her metformin dose --started downhill spiral 1+ years ago . She admits lazy with food choices past year. She likes to eat , dislikes bariatric surgery option.   Wants jumpstart!        Personal Healthcare Goals:    A1c at 7% , lose weight -goal 10+ lbs=happier , ultimate 160lbs . - she weighted that in 2004.         Allergies/ADRs: Reviewed in Epic  Tobacco: History of tobacco dependence - quit june13th -2013.   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   PMH: Reviewed in Epic; diagnosed June -2013.     Medication Adherence/Access:  no issues reported--she takes all her meds at  --that's how she remembers.   Patient takes medications directly from bottles.  Patient takes medications 1 time(s) per day.   Per patient, misses medication 0 times per week.   Medication barriers: feelings of burden/being overwhelmed.   The patient fills medications at [facility]: NO, fills medications at [facility], [facility], [facility] combined.    Diabetes:  Pt currently taking : [id number]. Metformin Er tabs. Pt is experiencing the following side effects: GI upset-but its mild and tolerable.  SMBG: one time daily, two times daily.   Ranges (based on glucometer readings): see below              Patient is not experiencing hypoglycemia  Recent symptoms of high blood sugar? fatigue  Eye exam: due  Foot exam: up to date  ACEi/ARB: Yes: Lisinopril.   Urine Albumin:   Lab Results   Component Value Date    UMALCR 8.52 01/09/2019      Aspirin: Not taking due to patient preference.   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   B-fast - eggmcmuffin at [employer] , oatmeal today worked well-[employer], ice tea, lunch = today -homemade soup, or leftovers from night before - usually meat and a vegetable, dinner-- goulash, tacos, burgers, chicken , loves fish , sardines-snacks,   Post dinner : cakes, pies, cereal , carbs.   Thao delightful bread now.         Lab Results   Component Value Date    A1C 10.7 01/09/2019    A1C 6.2 01/25/2017    A1C 8.3 03/28/2016    A1C 7.5 09/02/2015    A1C 6.6 08/20/2013     Hypertension: Current medications include :Lisinopril 10mg. qhs.  Patient does not self-monitor BP.  Patient reports no current medication side effects.    Hyperlipidemia: Current therapy includes : Atorvastatin 10mg once daily.  Pt reports no significant myalgias or other side effects.  The 10-year ASCVD risk score (Mariano ROSEN Jr., et al., 2013) is: 7.8%    Values used to calculate the score:      Age: 58 years      Sex: Female      Is Non- : No      Diabetic: Yes      Tobacco smoker: No      Systolic Blood Pressure: 138 mmHg      Is BP treated: Yes      HDL Cholesterol: 38 mg/dL      Total Cholesterol: 173 mg/dL    Recent Labs   Lab Test 01/09/19  0843 03/28/16  1812 06/25/13  0813   CHOL 173 197 197   HDL 38* 39* 37*   LDL 77 94 106   TRIG 292* 322* 272*   CHOLHDLRATIO  --   --  5.4*     Left hip Pain : Patient seeing iván tomorrow for consult -she is going to refuse any cortisone like shots due to past injections spiking her bs's.  She has a tens unit --has not been using it --just taking 2,400 mgs./IBU daily .       BP Readings from Last 1 Encounters:   01/14/19 138/76     Pulse Readings from Last 1 Encounters:   01/14/19 87     Wt Readings from Last 1 Encounters:   01/14/19 260 lb 12.8 oz (118.3 kg)     Ht Readings from Last 1 Encounters:   01/09/19 5' 5\" (1.651 m)     Estimated body mass index is 43.4 kg/m  as calculated from the following:    Height as of 1/9/19: 5' 5\" (1.651 m).    Weight as of this " encounter: 260 lb 12.8 oz (118.3 kg).    Temp Readings from Last 1 Encounters:   01/09/19 98.5  F (36.9  C) (Oral)           ASSESSMENT:                             Current medications were reviewed today.     Medication Adherence: excellent, no issues identified    Diabetes: Needs Improvement. Patient is not meeting A1c goal of < 7%. Self monitoring of blood glucose is not at goal of fasting  mg/dL and post prandial < 150 mg/dL. Pt would benefit from minimum SMBG: Check blood sugars fasting, and occasionally 2 hours after starting a meal.  Metformin :  stay on the same dose.  GLP-1 agonist (Ozempic) :  Start at dose : 0.25mg./week dose --first injection successfully given in office today .   Increased exercise as tolerated.   Updated Hemoglobin A1c-10.7%  Increase in home glucose monitoring-see plan.   Weight loss recommended-gave patient my 1 page low-carb diet info sheet. See plan for details.       Hypertension: Stable. Patient is meeting BP goal of < 140/90mmHg.         Hyperlipidemia: Stable. Pt is on moderate intensity statin which is indicated based on 2013 ACC/AHA guidelines for lipid management.      Left hip pain:  Needs improvement -- will see ortho tomorrow for consult -- in lieu of that continue daily IBU, try tens units and also otc icyhot patch with 4% lidocaine.       PLAN:                              1. FYI--A1c is 10.7% --too high -Goal is 7% or less.   Please stay on 3 --750mg. Metformin daily, read my 1 page low -carbohydrate diey handout --strive for less carbs, eat more non-starchy veggies--like brussels sprouts , drink  ounces of non -calorie liquid /day and give my intermittent fasting diet a try --that means non caloric only for 12 hours overnight --like 8pm -8am .     Please check blood sugars twice daily :  Fasting in AM before breakfast (goal is ) , and 2 hours after dinner -Goal then is <150.     Lets also ADD in once weekly Ozempic 0.25mg./week . First shot  in  office today . Congrats for successful first injection.     2. FYI--use your TENS unit for hip pain , also try icyhot patches with 4% lidocaine.         Next MTM visit:  4 weeks with blood sugar meter -- See me Monday February 11th -2019 at 4;30 pm.     I spent 60 minutes with this patient today. All changes were made via collaborative practice agreement with Elliott Askew (new pcp). A copy of the visit note was provided to the patient's referring provider.    The patient was given a summary of these recommendations as an after visit summary.         Brian Del Rio [facility].  Medication Therapy Management Provider  [phone]

## 2019-01-15 ENCOUNTER — OFFICE VISIT (OUTPATIENT)
Dept: ORTHOPEDICS | Facility: CLINIC | Age: 59
End: 2019-01-15
Payer: COMMERCIAL

## 2019-01-15 ENCOUNTER — ANCILLARY PROCEDURE (OUTPATIENT)
Dept: GENERAL RADIOLOGY | Facility: CLINIC | Age: 59
End: 2019-01-15
Payer: COMMERCIAL

## 2019-01-15 VITALS
BODY MASS INDEX: 43.32 KG/M2 | SYSTOLIC BLOOD PRESSURE: 128 MMHG | HEIGHT: 65 IN | DIASTOLIC BLOOD PRESSURE: 72 MMHG | WEIGHT: 260 LBS

## 2019-01-15 DIAGNOSIS — M51.369 LUMBAR DEGENERATIVE DISC DISEASE: ICD-10-CM

## 2019-01-15 DIAGNOSIS — M25.552 PAIN OF LEFT HIP JOINT: Primary | ICD-10-CM

## 2019-01-15 DIAGNOSIS — M54.16 LEFT LUMBAR RADICULOPATHY: ICD-10-CM

## 2019-01-15 DIAGNOSIS — M41.26 OTHER IDIOPATHIC SCOLIOSIS, LUMBAR REGION: ICD-10-CM

## 2019-01-15 DIAGNOSIS — M25.552 PAIN OF LEFT HIP JOINT: ICD-10-CM

## 2019-01-15 PROCEDURE — 99243 OFF/OP CNSLTJ NEW/EST LOW 30: CPT | Performed by: FAMILY MEDICINE

## 2019-01-15 PROCEDURE — 73502 X-RAY EXAM HIP UNI 2-3 VIEWS: CPT

## 2019-01-15 ASSESSMENT — MIFFLIN-ST. JEOR: SCORE: 1770.23

## 2019-01-15 NOTE — LETTER
1/15/2019         RE: Areli Stubbs  [address] [facility] [address]        Dear Colleague,    Thank you for referring your patient, Areli Stubbs, to the [facility]. Please see a copy of my visit note below.    ASSESSMENT & PLAN    1. Pain of left hip joint    2. Lumbar degenerative disc disease    3. Left lumbar radiculopathy    4. Other idiopathic scoliosis, lumbar region      Discussed exam - appears lumbar in origin  Reviewed xray - No arthritis  Physical therapy: [facility] - [phone]  Declines Medrol secondary to her diabetes   Ok to use Tylenol  Would be cautious about the amount of Ibuprofen you're using -currently taking max dose  Try to stay active, walking would be great  Look into walking in the pool / pool exercise    Follow-up after 2-3 therapy visits    -----    SUBJECTIVE  Areli Stubbs is a/an 58 year old female who is seen in consultation at the request of  Elliott Askew PA-C for evaluation of left hip pain. The patient is seen with their sister    Onset: 2-3 years(s) ago. Slipped on the ice 3 years ago, states that she didn't fall but feels like she pulled a muscle.  Location of Pain: left lateral hip with radiation into the groin and down the the front of the thigh to just above the knee  Rating of Pain at worst: 9/10  Rating of Pain Currently: 5/10  Worsened by: walking, bending to pick something up from the floor, twisting/turning on left leg  Better with: rest  Treatments tried: ibuprofen (2,400mg/day x 3-4 months)  Quality: constant dull aching pain, can be sharp if she moves wrong  Associated symptoms: no distal numbness or tingling; denies swelling or warmth  Orthopedic history: has been told previously that she has mild hip DJD and lumbar DDD   Relevant surgical history: right knee TKA  Patient Social History: works at a desk job- mostly seated    Patient's past medical, surgical, social, and family histories were  "reviewed today and no changes are noted.    REVIEW OF SYSTEMS:  10 point ROS is negative other than symptoms noted above in HPI, Past Medical History or as stated below  Constitutional: NEGATIVE for fever, chills, change in weight  Skin: NEGATIVE for worrisome rashes, moles or lesions  GI/: NEGATIVE for bowel or bladder changes  Neuro: NEGATIVE for weakness, dizziness or paresthesias    OBJECTIVE:  /72   Ht 1.651 m (5' 5\")   Wt 117.9 kg (260 lb)   LMP 06/15/2006   BMI 43.27 kg/m      General: healthy, alert and in no distress  HEENT: no scleral icterus or conjunctival erythema  Skin: no suspicious lesions or rash. No jaundice.  CV: no pedal edema  Resp: normal respiratory effort without conversational dyspnea   Psych: normal mood and affect  Gait: normal steady gait with appropriate coordination and balance  Neuro: Normal light sensory exam of lower extremity, DTRs 2+ patella and Achilles bilaterally  MSK:  LEFT HIP  Inspection:    No obvious deformity or asymmetry, level pelvis  Palpation:    Tender about the greater trochanteric region. Otherwise all other landmarks are nontender.  Active Range of Motion:     Flexion limited by pain, IR full, ER  full  Strength:    Flexion 5-/5, adduction 5-/5, abduction 5-/5  Special Tests:    Negative: Logroll, anterior impingement (FADIR)    THORACIC/LUMBAR SPINE  Inspection:    No gross deformity/asymmetry, level pelvis  Palpation:    Tender about the lumbar facet joints, left SI joint, right SI joint, left sciatic notch and right sciatic notch. Otherwise remainder of landmarks are nontender.  Range of Motion:     Lumbar flexion limited by pain    Lumbar extension does not worsen her symptoms  Strength:    quadriceps 5/5, hamstrings 5/5, gastrocsoleus 5/5, tibialis anterior 5/5, extensor hallicus longus 5/5  Special Tests:    Positive: slump test (left) -left anterior thigh    Independent visualization of the below image:  PELVIS WITH UNILATERAL HIP TWO VIEWS LEFT  " 1/15/2019 5:46 PM      HISTORY: Pain of left hip joint.     COMPARISON: None.     FINDINGS: Mild spurring. There is no acute fracture or dislocation.  There are no worrisome bony lesions.                                                                      IMPRESSION: No acute osseous abnormality demonstrated.     JEET BAKER MD    Patient's conditions were thoroughly discussed during today's visit with greater than 50% of the visit spent counseling the patient with total time spent face-to-face with the patient being 25 minutes.    Ranjit Carter DO [facility]      Again, thank you for allowing me to participate in the care of your patient.        Sincerely,        Ranjit Carter DO

## 2019-01-15 NOTE — PROGRESS NOTES
ASSESSMENT & PLAN    1. Pain of left hip joint    2. Lumbar degenerative disc disease    3. Left lumbar radiculopathy    4. Other idiopathic scoliosis, lumbar region      Discussed exam - appears lumbar in origin  Reviewed xray - No arthritis  Physical therapy: [facility] - [phone]  Declines Medrol secondary to her diabetes   Ok to use Tylenol  Would be cautious about the amount of Ibuprofen you're using -currently taking max dose  Try to stay active, walking would be great  Look into walking in the pool / pool exercise    Follow-up after 2-3 therapy visits    -----    SUBJECTIVE  Areli Stubbs is a/an 58 year old female who is seen in consultation at the request of  Elliott Askew PA-C for evaluation of left hip pain. The patient is seen with their sister    Onset: 2-3 years(s) ago. Slipped on the ice 3 years ago, states that she didn't fall but feels like she pulled a muscle.  Location of Pain: left lateral hip with radiation into the groin and down the the front of the thigh to just above the knee  Rating of Pain at worst: 9/10  Rating of Pain Currently: 5/10  Worsened by: walking, bending to pick something up from the floor, twisting/turning on left leg  Better with: rest  Treatments tried: ibuprofen (2,400mg/day x 3-4 months)  Quality: constant dull aching pain, can be sharp if she moves wrong  Associated symptoms: no distal numbness or tingling; denies swelling or warmth  Orthopedic history: has been told previously that she has mild hip DJD and lumbar DDD   Relevant surgical history: right knee TKA  Patient Social History: works at a desk job- mostly seated    Patient's past medical, surgical, social, and family histories were reviewed today and no changes are noted.    REVIEW OF SYSTEMS:  10 point ROS is negative other than symptoms noted above in HPI, Past Medical History or as stated below  Constitutional: NEGATIVE for fever, chills, change in weight  Skin: NEGATIVE for  "worrisome rashes, moles or lesions  GI/: NEGATIVE for bowel or bladder changes  Neuro: NEGATIVE for weakness, dizziness or paresthesias    OBJECTIVE:  /72   Ht 1.651 m (5' 5\")   Wt 117.9 kg (260 lb)   LMP 06/15/2006   BMI 43.27 kg/m     General: healthy, alert and in no distress  HEENT: no scleral icterus or conjunctival erythema  Skin: no suspicious lesions or rash. No jaundice.  CV: no pedal edema  Resp: normal respiratory effort without conversational dyspnea   Psych: normal mood and affect  Gait: normal steady gait with appropriate coordination and balance  Neuro: Normal light sensory exam of lower extremity, DTRs 2+ patella and Achilles bilaterally  MSK:  LEFT HIP  Inspection:    No obvious deformity or asymmetry, level pelvis  Palpation:    Tender about the greater trochanteric region. Otherwise all other landmarks are nontender.  Active Range of Motion:     Flexion limited by pain, IR full, ER  full  Strength:    Flexion 5-/5, adduction 5-/5, abduction 5-/5  Special Tests:    Negative: Logroll, anterior impingement (FADIR)    THORACIC/LUMBAR SPINE  Inspection:    No gross deformity/asymmetry, level pelvis  Palpation:    Tender about the lumbar facet joints, left SI joint, right SI joint, left sciatic notch and right sciatic notch. Otherwise remainder of landmarks are nontender.  Range of Motion:     Lumbar flexion limited by pain    Lumbar extension does not worsen her symptoms  Strength:    quadriceps 5/5, hamstrings 5/5, gastrocsoleus 5/5, tibialis anterior 5/5, extensor hallicus longus 5/5  Special Tests:    Positive: slump test (left) -left anterior thigh    Independent visualization of the below image:  PELVIS WITH UNILATERAL HIP TWO VIEWS LEFT  1/15/2019 5:46 PM      HISTORY: Pain of left hip joint.     COMPARISON: None.     FINDINGS: Mild spurring. There is no acute fracture or dislocation.  There are no worrisome bony lesions.                                                                 "      IMPRESSION: No acute osseous abnormality demonstrated.     JEET BAKER MD    Patient's conditions were thoroughly discussed during today's visit with greater than 50% of the visit spent counseling the patient with total time spent face-to-face with the patient being 25 minutes.    Ranjit Carter,  [facility]

## 2019-01-16 ENCOUNTER — TELEPHONE (OUTPATIENT)
Dept: ORTHOPEDICS | Facility: CLINIC | Age: 59
End: 2019-01-16

## 2019-01-16 NOTE — TELEPHONE ENCOUNTER
Patient LVM saying she was not able to schedule PT because the orders were not there.    Orders signed, patient notified     Silvina Mejia ATC

## 2019-01-16 NOTE — PATIENT INSTRUCTIONS
1. Pain of left hip joint    2. Lumbar degenerative disc disease    3. Left lumbar radiculopathy    4. Other idiopathic scoliosis, lumbar region      Discussed exam - appears to be coming from your back rather than your hip  Reviewed xray - No arthritis  Physical therapy: [facility] - [phone]  Will hold of on medrol given your diabetes.   Ok to use Tylenol  Would be cautious about the amount of Ibuprofen you're using  Try to stay active, walking would be great  Look into walking in the pool / pool exercise    Follow-up after 2-3 therapy visits

## 2019-02-04 ENCOUNTER — THERAPY VISIT (OUTPATIENT)
Dept: PHYSICAL THERAPY | Facility: CLINIC | Age: 59
End: 2019-02-04
Payer: COMMERCIAL

## 2019-02-04 DIAGNOSIS — M51.369 LUMBAR DEGENERATIVE DISC DISEASE: ICD-10-CM

## 2019-02-04 DIAGNOSIS — M41.26 OTHER IDIOPATHIC SCOLIOSIS, LUMBAR REGION: ICD-10-CM

## 2019-02-04 DIAGNOSIS — M25.552 PAIN OF LEFT HIP JOINT: ICD-10-CM

## 2019-02-04 DIAGNOSIS — M54.16 LEFT LUMBAR RADICULOPATHY: ICD-10-CM

## 2019-02-04 DIAGNOSIS — M54.5 LEFT LOW BACK PAIN, UNSPECIFIED CHRONICITY, WITH SCIATICA PRESENCE UNSPECIFIED: ICD-10-CM

## 2019-02-04 PROBLEM — M54.50 LEFT LOW BACK PAIN: Status: ACTIVE | Noted: 2019-02-04

## 2019-02-04 PROCEDURE — 97112 NEUROMUSCULAR REEDUCATION: CPT | Mod: GP | Performed by: PHYSICAL THERAPIST

## 2019-02-04 PROCEDURE — 97110 THERAPEUTIC EXERCISES: CPT | Mod: GP | Performed by: PHYSICAL THERAPIST

## 2019-02-04 PROCEDURE — 97162 PT EVAL MOD COMPLEX 30 MIN: CPT | Mod: GP | Performed by: PHYSICAL THERAPIST

## 2019-02-04 NOTE — PROGRESS NOTES
[facility] Initial Evaluation -- Lumbar    Date: February 4, 2019  Areli Stubbs is a 58 year old female with a LBP/L hip pain condition.   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  Leisure mechanical stresses: walking  Functional disability score (REA/STarT Back):  6 - high teressa  VAS score (0-10): 4/10  Patient goals/expectations:  Get rid of back pain    HISTORY:    Present symptoms: Middle of low back, L lateral hip, front of L thigh, L anterior groin  Pain quality (sharp/shooting/stabbing/aching/burning/cramping):  Sharp, achty   Paresthesia (yes/no):  no    Present since (onset date): 2/4/2018     Symptoms (improving/unchanging/worsening):  worsening.     Symptoms commenced as a result of: slipped on ice   Condition occurred in the following environment:   At home     Symptoms at onset (back/thigh/leg): L anterior hip   Constant symptoms (back/thigh/leg): pain feels constant, but not constant in one place - moves around  Intermittent symptoms (back/thigh/leg): middle of low back, L alteral hip, front of thigh, L anterior groin    Symptoms are made worse with the following: Always Bending, Always Sitting, Always Rising, Always Standing, Always Walking, Always Lying and Time of day - Always as the day progresses, and Always stairs.  Symptoms are made better with the following: nothing    Disturbed sleep (yes/no):  yes Sleeping postures (prone/sup/side R/L): R and L side    Previous episodes (0/1-5/6-10/11+): a few episodes Year of first episode: many years ago    Previous history: LBP  Previous treatments: chiropractic      Specific Questions:  Cough/Sneeze/Strain (pos/neg): neg  Bowel/Bladder (normal/abnormal): normal  Gait (normal/abnormal): normal  Medications (nil/NSAIDS/analg/steroids/anticoag/other):  Other - High blood pressure and diabetic meds  Medical allergies:  Adhesives,  sulfa drugs  General health (excellent/good/fair/poor):  fair  Pertinent medical history:  Diabetes, High blood pressure, Osteoarthritis and Smoking  Imaging (None/Xray/MRI/Other):  X-ray - mild arthritis  Recent or major surgery (yes/no):  R TKA 7 years ago, L rotator cuff 2 years ago  Night pain (yes/no): no  Accidents (yes/no): no  Unexplained weight loss (yes/no): no  Barriers at home: no  Other red flags: no    EXAMINATION    Posture:   Sitting (good/fair/poor): poor  Standing (good/fair/poor):poor  Lordosis (red/acc/normal): red  Correction of posture (better/worse/no effect): better    Lateral Shift (right/left/nil): Right  Relevant (yes/no):  yes  Other Observations: pt also has a leg length discrepancy and scoliosis contributing to R shift - but pt reports that it's worse since back pain.    Neurological:    Motor deficit:  Normal myotomes, B  Reflexes:  Didn't assess  Sensory deficit:  Didn't assess  Dural signs:  Didn't assess    Movement Loss:   Salvador Mod Min Nil Pain   Flexion  X   (++) in low back   Extension X    (++) low back   Side Gliding R X    (+)   Side Gliding L   X       Test Movements:   During: produces, abolishes, increases, decreases, no effect, centralizing, peripheralizing   After: better, worse, no better, no worse, no effect, centralized, peripheralized        If required, pretest symptoms: 4/10 LBP, L lateral hip/L anterior thigh pain   Symptoms During Symptoms After ROM increased ROM decreased No Effect                   SGIS - L Centralising    Centralized         Rep SGIS - L Centralising    Centralized    X       Static Tests:  None assesse    Other Tests: none    Provisional Classification:  Derangement - Asymmetrical, unilateral, symptoms above knee, with shift deformity    Principle of Management:  Education:  Postural education, traffic light guide for pain, goal of centralizing symptoms   Equipment provided:  None  Mechanical therapy (Y/N):  Y   Lateral Principle:  Repeated  Right side gliding in standing x 10 reps, hourly - followed by standing extension if sx centralized to low back    ASSESSMENT/PLAN:    Patient is a 58 year old female with lumbar complaints.    Patient has the following significant findings with corresponding treatment plan.                Diagnosis 1:  LBP secondary to derangement  Pain -  directional preference exercise and home program  Decreased ROM/flexibility - manual therapy, therapeutic exercise and home program  Decreased function - therapeutic activities and home program  Impaired posture - neuro re-education and home program    Therapy Evaluation Codes:   1) History comprised of:   Personal factors that impact the plan of care:      Time since onset of symptoms.    Comorbidity factors that impact the plan of care are:      Diabetes, High blood pressure, Osteoarthritis, Overweight and Smoking.     Medications impacting care: High blood pressure and diabetes meds.  2) Examination of Body Systems comprised of:   Body structures and functions that impact the plan of care:      Lumbar spine.   Activity limitations that impact the plan of care are:      Bathing, Bending, Driving, Dressing, Sitting, Stairs, Standing, Walking and Sleeping.  3) Clinical presentation characteristics are:   Evolving/Changing.  4) Decision-Making    Moderate complexity using standardized patient assessment instrument and/or measureable assessment of functional outcome.  Cumulative Therapy Evaluation is: Moderate complexity.    Previous and current functional limitations:  (See Goal Flow Sheet for this information)    Short term and Long term goals: (See Goal Flow Sheet for this information)     Communication ability:  Patient appears to be able to clearly communicate and understand verbal and written communication and follow directions correctly.  Treatment Explanation - The following has been discussed with the patient:   RX ordered/plan of care  Anticipated outcomes  Possible risks  and side effects  This patient would benefit from PT intervention to resume normal activities.   Rehab potential is good.    Frequency:  1 X week, once daily  Duration:  for 4-6 weeks  Discharge Plan:  Achieve all LTG.  Independent in home treatment program.  Reach maximal therapeutic benefit.    Please refer to the daily flowsheet for treatment today, total treatment time and time spent performing 1:1 timed codes.

## 2019-02-11 ENCOUNTER — OFFICE VISIT (OUTPATIENT)
Dept: PHARMACY | Facility: CLINIC | Age: 59
End: 2019-02-11
Payer: COMMERCIAL

## 2019-02-11 VITALS
SYSTOLIC BLOOD PRESSURE: 124 MMHG | WEIGHT: 251.8 LBS | HEART RATE: 89 BPM | DIASTOLIC BLOOD PRESSURE: 68 MMHG | BODY MASS INDEX: 41.9 KG/M2 | OXYGEN SATURATION: 96 %

## 2019-02-11 DIAGNOSIS — I10 HTN, GOAL BELOW 140/90: ICD-10-CM

## 2019-02-11 DIAGNOSIS — M25.552 HIP PAIN, LEFT: ICD-10-CM

## 2019-02-11 DIAGNOSIS — E11.65 UNCONTROLLED TYPE 2 DIABETES MELLITUS WITH HYPERGLYCEMIA (H): Primary | ICD-10-CM

## 2019-02-11 DIAGNOSIS — K21.00 GASTROESOPHAGEAL REFLUX DISEASE WITH ESOPHAGITIS: ICD-10-CM

## 2019-02-11 DIAGNOSIS — E78.5 HYPERLIPIDEMIA LDL GOAL <160: ICD-10-CM

## 2019-02-11 PROCEDURE — 99606 MTMS BY PHARM EST 15 MIN: CPT | Performed by: PHARMACIST

## 2019-02-11 PROCEDURE — 99607 MTMS BY PHARM ADDL 15 MIN: CPT | Performed by: PHARMACIST

## 2019-02-11 NOTE — PATIENT INSTRUCTIONS
Recommendations from today's MTM visit:                                                        1. fyi--Congrats --you've lost 11+ lbs this past month on improved diet and Ozempic 0.25mg./week.  Your blood sugars are dramatically better !!    Lets increase your ozempic weekly dose to 0.5mg./week. Start tonight .     2. For your acid reflux --lets trial  Ranitidine 150mg. Tabs --1 in am and bedtime as needed.         Next MTM visit:  4 weeks with blood sugar meter.  Monday March 11th -2019 at 4;30pm.     To schedule another MTM appointment, please call the clinic directly or you may call the MTM scheduling line at [phone] or toll-free at [phone].     My Clinical Pharmacist's contact information:                                                      It was a pleasure talking with you today!  Please feel free to contact me with any questions or concerns you have.      Brian Del Rio [facility].  Medication Therapy Management Provider  [phone]      You may receive a survey about the MTM services you received.  I would appreciate your feedback to help me serve you better in the future. Please fill it out and return it when you can. Your comments will be anonymous.

## 2019-02-11 NOTE — PROGRESS NOTES
SUBJECTIVE/OBJECTIVE:                           Areli Stubbs is a 58 year old female coming in for a follow-up (1-14-19) visit for Medication Therapy Management.  She was referred to me from her pcp Elliott duenas .          Chief Complaint:  Here for DM review post ozempic start.  Making good diet changes.   She feels so much better with weight loss and blood sugar improvement.     Prn acid reflux --can she get some rx for this ? She feels ozempic related .     Personal Healthcare Goals:    A1c at 7% , lose weight -goal 10+ lbs=happier , ultimate 160lbs . - she weighted that in 2004.         Allergies/ADRs: Reviewed in Epic  Tobacco: History of tobacco dependence - quit june13th -2013.   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   PMH: Reviewed in Epic; diagnosed June -2013.     Medication Adherence/Access:  no issues reported--she takes all her meds at  --that's how she remembers.   Patient takes medications directly from bottles.  Patient takes medications 1 time(s) per day.   Per patient, misses medication 0 times per week.   Medication barriers: feelings of burden/being overwhelmed.   The patient fills medications at [place]: NO, fills medications at [facility], [facility], [place] combined.    Diabetes:  Pt currently taking : [id number]. Metformin Er tabs, ozempic 0.25mg./week. Pt is experiencing the following side effects: none now!   SMBG: one time daily, two times daily.   Ranges (based on glucometer readings): see below                              Patient is not experiencing hypoglycemia  Recent symptoms of high blood sugar? fatigue  Eye exam: due  Foot exam: up to date  ACEi/ARB: Yes: Lisinopril.   Urine Albumin:   Lab Results   Component Value Date    UMALCR 8.52 01/09/2019      Aspirin: Not taking due to patient preference.   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   Making great food changes --at home --loves seafood.       Lab Results   Component Value Date    A1C 10.7 01/09/2019    A1C 6.2 01/25/2017    A1C 8.3 03/28/2016    A1C 7.5 09/02/2015    A1C 6.6 08/20/2013     Hypertension: Current medications include :Lisinopril 10mg. qhs.  Patient does not self-monitor BP.  Patient reports no current medication side effects.    BP Readings from Last 3 Encounters:   02/11/19 124/68   01/15/19 128/72   01/14/19 138/76         Hyperlipidemia: Current therapy includes : Atorvastatin 10mg once daily.  Pt reports no significant myalgias or other side effects.  The 10-year ASCVD risk score (Mariano ROSEN JrNabila, et al., 2013) is: 7.4%    Values used to calculate the score:      Age: 59 years      Sex: Female      Is Non- : No      Diabetic: Yes      Tobacco smoker: No      Systolic Blood Pressure: 128 mmHg      Is BP treated: Yes      HDL Cholesterol: 38 mg/dL      Total Cholesterol: 173 mg/dL    Recent Labs   Lab Test 01/09/19  0843 03/28/16  1812 06/25/13  0813   CHOL 173 197 197   HDL 38* 39* 37*   LDL 77 94 106   TRIG 292* 322* 272*   CHOLHDLRATIO  --   --  5.4*     Left hip Pain : Patient seeing orcyno tomorrow for consult -she is going to refuse any cortisone like shots due to past injections spiking her bs's.  She has a tens unit --has not been using it --just taking 2,400 mgs./IBU daily .     GERD: Current medications include: None . Pt c/o heartburn, fullness after meals, symptoms primarily relate to meals, and lying down after meals.   The patient does not have a history of GI bleed.   Patient feels that current regimen is not effective. She'd like a drug if she can use prn.       BP Readings from Last 1 Encounters:   02/11/19 124/68     Pulse Readings from Last 1 Encounters:   02/11/19 89     Wt Readings from Last 1 Encounters:   02/11/19 251 lb 12.8 oz (114.2 kg)     Ht Readings from Last 1 Encounters:   01/15/19 5' 5\" (1.651 m)     Estimated body mass " "index is 41.9 kg/m  as calculated from the following:    Height as of 1/15/19: 5' 5\" (1.651 m).    Weight as of this encounter: 251 lb 12.8 oz (114.2 kg).    Temp Readings from Last 1 Encounters:   01/09/19 98.5  F (36.9  C) (Oral)           ASSESSMENT:                             Current medications were reviewed today.     Medication Adherence: excellent, no issues identified    Diabetes: Rapidly improving /Needs Improvement. Patient is not meeting A1c goal of < 7%. Self monitoring of blood glucose is not at goal of fasting  mg/dL and post prandial < 150 mg/dL. Pt would benefit from minimum SMBG: Check blood sugars fasting, and occasionally 2 hours after starting a meal.  Metformin :  stay on the same dose.  GLP-1 agonist (Ozempic) : Increase weekly dose to 0.5mg.   Increased exercise as tolerated.   Weight loss recommended-gave patient my 1 page low-carb diet info sheet. Plan is working well-lost >11 lbs in 1 month .       Hypertension: Stable. Patient is meeting BP goal of < 140/90mmHg.         Hyperlipidemia: Stable. Pt is on moderate intensity statin which is indicated based on 2013 ACC/AHA guidelines for lipid management.      Left hip pain:  Needs improvement -- will see ortho tomorrow for consult -- in lieu of that continue daily IBU, try tens units and also otc icyhot patch with 4% lidocaine.     GERD: Needs improvement.  Current treatment is not effective. Chronic PPI use places patient at an increased risk of C. Diff, hypomagnesemia and lower bone mineral density, these risks were reviewed with the patient.  Pt would benefit from the following changes: H2 blocker.see plan.         PLAN:                              1. fyi--Congrats --you've lost 11+ lbs this past month on improved diet and Ozempic 0.25mg./week.  Your blood sugars are dramatically better !!    Lets increase your ozempic weekly dose to 0.5mg./week. Start tonight .     2. For your acid reflux --lets trial  Ranitidine 150mg. Tabs --1 " in am and bedtime as needed.         Next MTM visit:  4 weeks with blood sugar meter.  Monday March 11th -2019 at 4;30pm.      I spent 60 minutes with this patient today. All changes were made via collaborative practice agreement with Elliott Askew (new pcp). A copy of the visit note was provided to the patient's referring provider.    The patient was given a summary of these recommendations as an after visit summary.         Brian Del Rio [facility].  Medication Therapy Management Provider  [phone]

## 2019-02-13 ENCOUNTER — MYC MEDICAL ADVICE (OUTPATIENT)
Dept: PHARMACY | Facility: CLINIC | Age: 59
End: 2019-02-13

## 2019-02-13 DIAGNOSIS — R11.0 NAUSEA: Primary | ICD-10-CM

## 2019-02-13 RX ORDER — ONDANSETRON 4 MG/1
4 TABLET, FILM COATED ORAL EVERY 6 HOURS PRN
Qty: 30 TABLET | Refills: 1 | Status: SHIPPED | OUTPATIENT
Start: 2019-02-13 | End: 2019-02-20

## 2019-02-13 NOTE — TELEPHONE ENCOUNTER
2-13-19    May I please have a prescription for nausea? It's pretty constant this time.    Thank you.    Areli Stubbs mt will send in zofran rx to help ozempic induced nausea.     keely

## 2019-02-15 ENCOUNTER — MYC MEDICAL ADVICE (OUTPATIENT)
Dept: FAMILY MEDICINE | Facility: CLINIC | Age: 59
End: 2019-02-15

## 2019-02-19 ENCOUNTER — TRANSFERRED RECORDS (OUTPATIENT)
Dept: HEALTH INFORMATION MANAGEMENT | Facility: CLINIC | Age: 59
End: 2019-02-19

## 2019-02-19 LAB — RETINOPATHY: NEGATIVE

## 2019-02-27 NOTE — PROGRESS NOTES
HISTORY OF PRESENT ILLNESS:    Areli Stubbs is a 57 year old female who is seen in follow up for Left shoulder arthroscopic DEC/DCR, mini open RCR, DOS 2/14/2017, Dr. Miller.  Present symptoms: Pt reports improving pain levels.  Down to PRN oxycodone.  Using sling when out of home and holds arm around the house, passive motions only.  Doing pendulum and elbow hand motions.  No new complaints.   Denies Chest pain, Calve pain, Fever, Chills.    Current Treatment: postop, sling, pendulum.    PHYSICAL EXAM:  LMP 06/15/2006  There is no height or weight on file to calculate BMI.   GENERAL APPEARANCE: healthy, alert and no distress   PSYCH:  mentation appears normal and affect normal/bright    MSK:  Left: Shoulder .  Ambulates: WNG - presents in well fitted sling..  Incision clean and dry, staples. present, healing.  Appropriate incisional erythema.   No Ecchymosis  Axilla clean with no skin lesion..  No calve pain on palpation.  Edema min at shulder  CMS: miky incisional numbness, otherwise grossly intact.  AROM demonstrates pendulum..      IMAGING INTERPRETATION:  None today..     ASSESSMENT:  Areli Stubbs is a 57 year old female S/P Left shoulder arthroscopic DEC/DCR, mini open RCR, DOS 2/14/2017, Dr. Miller..  Doing well.     PLAN:  - Surgery discussed, images reviewed if applicable, and all questions were answered at this time.  - staples removed with sterile technique, steri-strips applied in usual fashion, care instructions given and verbally acknowledged.  - Medications: as current.  - Physical Therapy: As directed at discharge/ to begin at 4 weeks post op, pendulum and hand/elbow motions til then.  - Sling and no active motions at shoulder 4 weeks post op.    Return to clinic 4, weeks.    Brian Loco PA-C    Dept. Orthopedic Surgery  [facility]   2/23/2017      
For information on Fall & Injury Prevention, visit [email or web address]

## 2019-03-06 ENCOUNTER — MYC MEDICAL ADVICE (OUTPATIENT)
Dept: PHARMACY | Facility: CLINIC | Age: 59
End: 2019-03-06

## 2019-03-06 NOTE — TELEPHONE ENCOUNTER
3-6-19    I believe I need a laxative. It has been a week since I've had a bowel movement, and I'm having burps that don't smell right. If you can recommend an over the counter brand, I will pick it up.    Also, [facility] has changed my meter and I guess they need a doctor's office review to send strips for ONE TOUCH ULTRA STRIP [id number]. They've said they have contacted the office, but haven't received a reply.    Thank you.    Areli Enoc        mtm will recommend miralax + senna-s  For her constipation (bisacodyl and enema if desperate), as well as re-ordering her one touch ultra blue test strips .    Brian Del Rio [facility].  Medication Therapy Management Provider  [phone]

## 2019-03-10 NOTE — PROGRESS NOTES
SUBJECTIVE/OBJECTIVE:                           Areli Stubbs is a 59 year old female coming in for a follow-up (2-11-19) visit for Medication Therapy Management.  She was referred to me from her pcp Elliott duenas .          Chief Complaint:  Here for weight and bs review .  She feels good , more energy , not sleeping all the time , hips not hurting as much .      ozempic working well!  Constipation issues --using prn miralax--some confusion about if miralax raises bs's? She will retry it -prn again now.      High stress with 27 year old son -they are not getting along.         Personal Healthcare Goals:    A1c at 7% , lose weight -goal 10+ lbs=happier , ultimate 160lbs . - she weighed that in 2004.         Allergies/ADRs: Reviewed in Epic  Tobacco: History of tobacco dependence - quit june13th -2013.   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   PMH: Reviewed in Epic; diagnosed June -2013.     Medication Adherence/Access:  no issues reported--she takes all her meds at  --that's how she remembers.   Patient takes medications directly from bottles.  Patient takes medications 1 time(s) per day.   Per patient, misses medication 0 times per week.   Medication barriers: feelings of burden/being overwhelmed.   The patient fills medications at [place]: NO, fills medications at [facility], [facility], [place] combined.    Diabetes:  Pt currently taking : [id number]. Metformin ER tabs, Ozempic 0.5mg./week. Pt is experiencing the following side effects: none now!   SMBG: one time daily, two times daily.   Ranges (based on glucometer readings): she forgot bs meter at home but is using the one touch reveal erica on her phone:    Her bs today was 136mg/dl.     14 readings(3-5-3-11-19) =130 avg. At 2.0 avg. Readings   71% readings in green range , 29% are high readings --all fasting in am  bs;'s.                        Patient is not experiencing hypoglycemia  Recent symptoms of high blood sugar? fatigue  Eye exam: due  Foot exam: up to date  ACEi/ARB: Yes: Lisinopril.   Urine Albumin:   Lab Results   Component Value Date    UMALCR 8.52 01/09/2019      Aspirin: Not taking due to patient preference.   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   Making great food changes --at home --loves seafood.       Lab Results   Component Value Date    A1C 10.7 01/09/2019    A1C 6.2 01/25/2017    A1C 8.3 03/28/2016    A1C 7.5 09/02/2015    A1C 6.6 08/20/2013     Hypertension: Current medications include :Lisinopril 10mg. qhs.  Patient does not self-monitor BP.  Patient reports no current medication side effects.    BP Readings from Last 3 Encounters:   03/11/19 110/66   02/11/19 124/68   01/15/19 128/72         Hyperlipidemia: Current therapy includes : Atorvastatin 10mg once daily.  Pt reports no significant myalgias or other side effects.  The 10-year ASCVD risk score (Marianoleeanne ROSEN Jr., et al., 2013) is: 5.5%    Values used to calculate the score:      Age: 59 years      Sex: Female      Is Non- : No      Diabetic: Yes      Tobacco smoker: No      Systolic Blood Pressure: 110 mmHg      Is BP treated: Yes      HDL Cholesterol: 38 mg/dL      Total Cholesterol: 173 mg/dL    Recent Labs   Lab Test 01/09/19  0843 03/28/16  1812 06/25/13  0813   CHOL 173 197 197   HDL 38* 39* 37*   LDL 77 94 106   TRIG 292* 322* 272*   CHOLHDLRATIO  --   --  5.4*     Left hip Pain : Patient seeing ortho consult (they stated bad scoliosis -was given exercises -helps a lot )-she is going to refuse any cortisone like shots due to past injections spiking her bs's.  She has a tens unit --has not been using it --just taking 0-600mg./day-IBU daily .     GERD: Current medications include: bedtime Ranitidine 150mg. Pt c/o heartburn, fullness after meals, symptoms primarily relate to meals, and  "lying down after meals.   The patient does not have a history of GI bleed.   Patient feels that current regimen is now effective.       BP Readings from Last 1 Encounters:   03/11/19 110/66     Pulse Readings from Last 1 Encounters:   03/11/19 92     Wt Readings from Last 1 Encounters:   03/11/19 243 lb 3.2 oz (110.3 kg)     Ht Readings from Last 1 Encounters:   01/15/19 5' 5\" (1.651 m)     Estimated body mass index is 40.47 kg/m  as calculated from the following:    Height as of 1/15/19: 5' 5\" (1.651 m).    Weight as of this encounter: 243 lb 3.2 oz (110.3 kg).    Temp Readings from Last 1 Encounters:   01/09/19 98.5  F (36.9  C) (Oral)           ASSESSMENT:                             Current medications were reviewed today.     Medication Adherence: excellent, no issues identified    Diabetes: Rapidly improving /Needs Improvement. Patient is not meeting A1c goal of < 7%. Self monitoring of blood glucose is not at goal of fasting  mg/dL and post prandial < 150 mg/dL. Pt would benefit from minimum SMBG: Check blood sugars fasting, and occasionally 2 hours after starting a meal.  Metformin :  stay on the same dose.  GLP-1 agonist (Ozempic) : stay on 0.5mg./week.   Increased exercise as tolerated.   Weight loss recommended-gave patient my 1 page low-carb diet info sheet. Plan is working well-lost >19 lbs in 2 months .       Hypertension: Stable. Patient is meeting BP goal of < 140/90mmHg.         Hyperlipidemia: Stable. Pt is on moderate intensity statin which is indicated based on 2013 ACC/AHA guidelines for lipid management.      Left hip pain:  Improved with wt.loss and PT exercises.       GERD: Stable.  Current treatment is effective.          PLAN:                              1. FYI-- weight today is 243.2lbs -- you've lost 19lbs. Now in 2 months on Ozempic and better diet and exercise choices.     Blood sugar avg. Last 7 days =136 --fantastic improvement.             Next MTM visit:   Please see me -May " 6th -2019 at 4:30pm -- A1c lab . Please bring your blood sugar meter to the visit.     I spent 30 minutes with this patient today. All changes were made via collaborative practice agreement with Elliott Askew (new pcp). A copy of the visit note was provided to the patient's referring provider.    The patient was given a summary of these recommendations as an after visit summary.         Brian Del Rio Rp.  Medication Therapy Management Provider  [phone]

## 2019-03-11 ENCOUNTER — OFFICE VISIT (OUTPATIENT)
Dept: PHARMACY | Facility: CLINIC | Age: 59
End: 2019-03-11
Payer: COMMERCIAL

## 2019-03-11 VITALS
SYSTOLIC BLOOD PRESSURE: 110 MMHG | BODY MASS INDEX: 40.47 KG/M2 | HEART RATE: 92 BPM | WEIGHT: 243.2 LBS | OXYGEN SATURATION: 96 % | DIASTOLIC BLOOD PRESSURE: 66 MMHG

## 2019-03-11 DIAGNOSIS — E78.5 HYPERLIPIDEMIA LDL GOAL <160: ICD-10-CM

## 2019-03-11 DIAGNOSIS — I10 HTN, GOAL BELOW 140/90: ICD-10-CM

## 2019-03-11 DIAGNOSIS — K21.00 GASTROESOPHAGEAL REFLUX DISEASE WITH ESOPHAGITIS: ICD-10-CM

## 2019-03-11 DIAGNOSIS — E11.65 UNCONTROLLED TYPE 2 DIABETES MELLITUS WITH HYPERGLYCEMIA (H): Primary | ICD-10-CM

## 2019-03-11 DIAGNOSIS — M25.552 HIP PAIN, LEFT: ICD-10-CM

## 2019-03-11 PROCEDURE — 99607 MTMS BY PHARM ADDL 15 MIN: CPT | Performed by: PHARMACIST

## 2019-03-11 PROCEDURE — 99606 MTMS BY PHARM EST 15 MIN: CPT | Performed by: PHARMACIST

## 2019-03-11 NOTE — PATIENT INSTRUCTIONS
Recommendations from today's MTM visit:                                                        1. FYI-- weight today is 243.2lbs -- you've lost 19lbs. Now in 2 months on Ozempic and better diet and exercise choices.     Blood sugar avg. Last 7 days =136 --fantastic improvement.             Next MTM visit:   Please see me -May 6th -2019 at 4:30pm -- A1c lab . Please bring your blood sugar meter to the visit.     To schedule another MTM appointment, please call the clinic directly or you may call the MTM scheduling line at [phone] or toll-free at [phone].     My Clinical Pharmacist's contact information:                                                      It was a pleasure talking with you today!  Please feel free to contact me with any questions or concerns you have.      Brian Del Rio Rph.  Medication Therapy Management Provider  [phone]      You may receive a survey about the MTM services you received by email and/or US Mail.  I would appreciate your feedback to help me serve you better in the future. Your comments will be anonymous.

## 2019-03-11 NOTE — Clinical Note
Elliott--dylan--magalys doing great --lost 19 lbs.- and blood sugars look great --a1c in may should be matute !!Brian

## 2019-04-08 ENCOUNTER — MYC MEDICAL ADVICE (OUTPATIENT)
Dept: PHARMACY | Facility: CLINIC | Age: 59
End: 2019-04-08

## 2019-04-08 NOTE — TELEPHONE ENCOUNTER
Michelle --dylan-- I have a conflict on Monday may 6th --are you able to change your appt. With me that day to either earlier in day or if you need a late appt--can we pick a different day to see me ?    Let me know -thanks -imelda kohli will change her appt. To 5-23-19 at 4;30pm.    Imelda Del Rio [facility].  Medication Therapy Management Provider  [phone]

## 2019-04-08 NOTE — TELEPHONE ENCOUNTER
It wouldn't let me reply to the last message you sent. I would prefer to see you sooner rather than later, so if there is an early morning appointment sooner than May 23rd, I would rather do that.    Areli kohli will change her appt. To Thursday April 25th at 9;30am.     Brian Del Rio Rp.  Medication Therapy Management Provider  [phone]

## 2019-04-25 ENCOUNTER — OFFICE VISIT (OUTPATIENT)
Dept: PHARMACY | Facility: CLINIC | Age: 59
End: 2019-04-25
Payer: COMMERCIAL

## 2019-04-25 VITALS
HEART RATE: 90 BPM | SYSTOLIC BLOOD PRESSURE: 108 MMHG | BODY MASS INDEX: 37.81 KG/M2 | WEIGHT: 227.2 LBS | DIASTOLIC BLOOD PRESSURE: 60 MMHG | OXYGEN SATURATION: 95 %

## 2019-04-25 DIAGNOSIS — M25.552 HIP PAIN, LEFT: ICD-10-CM

## 2019-04-25 DIAGNOSIS — E11.65 UNCONTROLLED TYPE 2 DIABETES MELLITUS WITH HYPERGLYCEMIA (H): Primary | ICD-10-CM

## 2019-04-25 DIAGNOSIS — K21.00 GASTROESOPHAGEAL REFLUX DISEASE WITH ESOPHAGITIS: ICD-10-CM

## 2019-04-25 DIAGNOSIS — I10 HTN, GOAL BELOW 140/90: ICD-10-CM

## 2019-04-25 DIAGNOSIS — E11.65 UNCONTROLLED TYPE 2 DIABETES MELLITUS WITH HYPERGLYCEMIA (H): ICD-10-CM

## 2019-04-25 DIAGNOSIS — E78.5 HYPERLIPIDEMIA LDL GOAL <160: ICD-10-CM

## 2019-04-25 LAB — HBA1C MFR BLD: 5.7 % (ref 0–5.6)

## 2019-04-25 PROCEDURE — 99607 MTMS BY PHARM ADDL 15 MIN: CPT | Performed by: PHARMACIST

## 2019-04-25 PROCEDURE — 36415 COLL VENOUS BLD VENIPUNCTURE: CPT | Performed by: PHYSICIAN ASSISTANT

## 2019-04-25 PROCEDURE — 99606 MTMS BY PHARM EST 15 MIN: CPT | Performed by: PHARMACIST

## 2019-04-25 PROCEDURE — 83036 HEMOGLOBIN GLYCOSYLATED A1C: CPT | Performed by: PHYSICIAN ASSISTANT

## 2019-04-25 NOTE — PROGRESS NOTES
SUBJECTIVE/OBJECTIVE:                           Areli Stubbs is a 59 year old female coming in for a follow-up (3-11-19) visit for Medication Therapy Management.  She was referred to me from her pcp Elliott duenas .          Chief Complaint:  Here for a1c --lost 34 lbs since January --loves ozempic.         High stress with 27 year old son -they are not getting along.   Personal Healthcare Goals:    A1c at 7% , lose weight -goal 10+ lbs=happier , ultimate 160lbs . - she weighed that in 2004.         Allergies/ADRs: Reviewed in Epic  Tobacco: History of tobacco dependence - quit june13th -2013.   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   PMH: Reviewed in Epic; diagnosed June -2013.     Medication Adherence/Access:  no issues reported--she takes all her meds at  --that's how she remembers.   Patient takes medications directly from bottles.  Patient takes medications 1 time(s) per day.   Per patient, misses medication 0 times per week.   Medication barriers: feelings of burden/being overwhelmed.   The patient fills medications at [place]: NO, fills medications at [facility], [facility], [place] combined.    Diabetes:  Pt currently taking : [id number]. Metformin ER tabs, Ozempic 0.5mg./week. Pt is experiencing the following side effects: none now!   SMBG: one time daily, two times daily.   Ranges (based on glucometer readings): se chart below.                         Patient is not experiencing hypoglycemia  Recent symptoms of high blood sugar? fatigue  Eye exam: due  Foot exam: up to date  ACEi/ARB: Yes: Lisinopril.   Urine Albumin:   Lab Results   Component Value Date    UMALCR 8.52 01/09/2019      Aspirin: Not taking due to patient preference.   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   Making great food changes --at home --loves seafood.       Lab Results   Component  "Value Date    A1C 5.7 04/25/2019    A1C 10.7 01/09/2019    A1C 6.2 01/25/2017    A1C 8.3 03/28/2016    A1C 7.5 09/02/2015           Hypertension: Current medications include :Lisinopril 10mg. qhs.  Patient does not self-monitor BP.  Patient reports no current medication side effects.    BP Readings from Last 3 Encounters:   04/25/19 108/60   03/11/19 110/66   02/11/19 124/68         Hyperlipidemia: Current therapy includes : Atorvastatin 10mg once daily.  Pt reports no significant myalgias or other side effects.  The 10-year ASCVD risk score (Mariano ROSEN Jr., et al., 2013) is: 5.3%    Values used to calculate the score:      Age: 59 years      Sex: Female      Is Non- : No      Diabetic: Yes      Tobacco smoker: No      Systolic Blood Pressure: 108 mmHg      Is BP treated: Yes      HDL Cholesterol: 38 mg/dL      Total Cholesterol: 173 mg/dL    Recent Labs   Lab Test 01/09/19  0843 03/28/16  1812 06/25/13  0813   CHOL 173 197 197   HDL 38* 39* 37*   LDL 77 94 106   TRIG 292* 322* 272*   CHOLHDLRATIO  --   --  5.4*     Left hip Pain : Patient seeing ortho consult (they stated bad scoliosis -was given exercises -helps a lot )-she is going to refuse any cortisone like shots due to past injections spiking her bs's.  She has a tens unit --has not been using it --just taking 0-600mg./day-IBU daily .     GERD: Current medications include: bedtime Ranitidine 150mg. Pt c/o heartburn, fullness after meals, symptoms primarily relate to meals, and lying down after meals.   The patient does not have a history of GI bleed.   Patient feels that current regimen is now effective.       BP Readings from Last 1 Encounters:   04/25/19 108/60     Pulse Readings from Last 1 Encounters:   04/25/19 90     Wt Readings from Last 1 Encounters:   04/25/19 227 lb 3.2 oz (103.1 kg)     Ht Readings from Last 1 Encounters:   01/15/19 5' 5\" (1.651 m)     Estimated body mass index is 37.81 kg/m  as calculated from the following:   " " Height as of 1/15/19: 5' 5\" (1.651 m).    Weight as of this encounter: 227 lb 3.2 oz (103.1 kg).    Temp Readings from Last 1 Encounters:   01/09/19 98.5  F (36.9  C) (Oral)           ASSESSMENT:                             Current medications were reviewed today.     Medication Adherence: excellent, no issues identified    Diabetes: Stable. Patient is meeting A1c goal of < 7%. Self monitoring of blood glucose is at goal of fasting  mg/dL and post prandial < 150 mg/dL. Pt would benefit from minimum SMBG: Check blood sugars fasting, and occasionally 2 hours after starting a meal.  Metformin :  decrease dose to (se plan for slow taper off as long as bs's don't rebound).   GLP-1 agonist (Ozempic) :  stay on the same dose.  Increased exercise  Weight loss recommended-stay on keto+ intermittent fasting diet.       Hypertension: Stable. Patient is meeting BP goal of < 140/90mmHg.         Hyperlipidemia: Stable. Pt is on moderate intensity statin which is indicated based on 2013 ACC/AHA guidelines for lipid management.      Left hip pain:  Improved with wt.loss and PT exercises.       GERD: Stable.  Current treatment is effective.          PLAN:                              1. Weight today is 227.2 lbs --lost 34 lbs. Since January.  A1c today is an incredible 5.7% .  Stay on ozempic 0.5mg. Weekly,  Wean off metformin as follows : drop 1 tablet weekly next 3 weeks as long as blood sugars look good/stable.     Keep working on ketogenic diet + intermittent fasting !!  Don't eat if your not hungry !       Next [facility] visit:  Thursday August 29th at 9:30am. A1c lab recheck.     I spent 30 minutes with this patient today. All changes were made via collaborative practice agreement with Elliott Askew (new pcp). A copy of the visit note was provided to the patient's referring provider.    The patient was given a summary of these recommendations as an after visit summary.         Brian Del Rio Rph.  Medication Therapy " Management Provider  [phone]

## 2019-04-25 NOTE — PATIENT INSTRUCTIONS
Recommendations from today's MTM visit:                                                        1. Weight today is 227.2 lbs --lost 34 lbs. Since January.  A1c today is an incredible 5.7% .  Stay on ozempic 0.5mg. Weekly,  Wean off metformin as follows : drop 1 tablet weekly next 3 weeks as long as blood sugars look good/stable.     Keep working on ketogenic diet + intermittent fasting !!  Don't eat if your not hungry !       Next MTM visit:  Thursday August 29th at 9:30am. A1c lab recheck.     To schedule another MTM appointment, please call the clinic directly or you may call the MTM scheduling line at [phone] or toll-free at [phone].     My Clinical Pharmacist's contact information:                                                      It was a pleasure talking with you today!  Please feel free to contact me with any questions or concerns you have.      Brian Del Rio Rph.  Medication Therapy Management Provider  [phone]      You may receive a survey about the MTM services you received by email and/or US Mail.  I would appreciate your feedback to help me serve you better in the future. Your comments will be anonymous.

## 2019-04-30 PROBLEM — M54.50 LEFT LOW BACK PAIN: Status: RESOLVED | Noted: 2019-02-04 | Resolved: 2019-04-30

## 2019-05-07 ENCOUNTER — TELEPHONE (OUTPATIENT)
Dept: FAMILY MEDICINE | Facility: CLINIC | Age: 59
End: 2019-05-07

## 2019-05-07 NOTE — TELEPHONE ENCOUNTER
Panel Management Review      Patient has the following on her problem list:     Depression / Dysthymia review    Measure:  Needs PHQ-9 score of 4 or less during index window.  Administer PHQ-9 and if score is 5 or more, send encounter to provider for next steps.    5 - 7 month window range: 5/9/19 - 9/9/19    PHQ-9 SCORE 3/28/2016 1/25/2017 1/9/2019   PHQ-9 Total Score - - -   PHQ-9 Total Score 6 4 12       If PHQ-9 recheck is 5 or more, route to provider for next steps.    Patient is due for:  PHQ9    Diabetes    ASA: Failed    Last A1C  Lab Results   Component Value Date    A1C 5.7 04/25/2019    A1C 10.7 01/09/2019    A1C 6.2 01/25/2017    A1C 8.3 03/28/2016    A1C 7.5 09/02/2015     A1C tested: Passed    Last LDL:    Lab Results   Component Value Date    CHOL 173 01/09/2019     Lab Results   Component Value Date    HDL 38 01/09/2019     Lab Results   Component Value Date    LDL 77 01/09/2019     Lab Results   Component Value Date    TRIG 292 01/09/2019     Lab Results   Component Value Date    CHOLHDLRATIO 5.4 06/25/2013     Lab Results   Component Value Date    NHDL 135 01/09/2019       Is the patient on a Statin? YES             Is the patient on Aspirin? NO    Medications     HMG CoA Reductase Inhibitors     atorvastatin (LIPITOR) 10 MG tablet             Last three blood pressure readings:  BP Readings from Last 3 Encounters:   04/25/19 108/60   03/11/19 110/66   02/11/19 124/68       Date of last diabetes office visit: 4/25/19     Tobacco History:     History   Smoking Status     Former Smoker     Packs/day: 0.50     Types: Cigarettes     Quit date: 6/13/2013   Smokeless Tobacco     Never Used     Comment: quit 6/13/13           Composite cancer screening  Chart review shows that this patient is due/due soon for the following Mammogram and Fecal Colorectal (FIT)  Summary:    Patient is due/failing the following:   FIT, MAMMOGRAM and PHQ9    Action needed:   Patient needs to do PHQ9., Patient needs  non-fasting lab only appointment and Patient needs referral/order: mammogram    Type of outreach:    Sent [employer] message.    Questions for provider review:    None                                                                                                                                    Eri Denis MA       Chart routed to Care Team .

## 2019-05-28 ENCOUNTER — TELEPHONE (OUTPATIENT)
Dept: FAMILY MEDICINE | Facility: CLINIC | Age: 59
End: 2019-05-28

## 2019-05-28 NOTE — TELEPHONE ENCOUNTER
5-28-19      mtm called ad spoke with ericka from her insurance -- PA for ozempic 1mg. Approved from 5- -5-.      mtm will notify patient via "[employer]t .[facility]    Brian Del Rio Rph.  Medication Therapy Management Provider  [phone]

## 2019-05-28 NOTE — TELEPHONE ENCOUNTER
Pt's insurance requires a PA on Ozempic    Diagnosis Code:     Please provide reasoning / documentation and forward to PA team at [id number].  Thanks!!    PA NEEDED ON: Ozempic 1mg  INS IS: Paid/[employer]  BIN: [id number]  PHONE # IS: [phone]  ID # IS: [id number]  GROUP: [id number]  PCN: -    PLEASE LET US KNOW WHEN PA IS GRANTED/DENIED.  THANK YOU!    Luz Pugh, Cirilo  [facility]  [phone]

## 2019-06-18 DIAGNOSIS — E11.65 UNCONTROLLED TYPE 2 DIABETES MELLITUS WITH HYPERGLYCEMIA (H): ICD-10-CM

## 2019-06-18 NOTE — TELEPHONE ENCOUNTER
Message from Pharm- This pen comes in a souble matthew,need to dispense 2 pens.  Each pen is 1.5MLs for a 1 month supply.      Requested Prescriptions   Pending Prescriptions Disp Refills     semaglutide (OZEMPIC) 1 MG/DOSE pen 1.5 mL 5     Sig: Inject 1 mg Subcutaneous every 7 days   Last Written Prescription Date:  5/28/19  Last Fill Quantity: 1.5mL  ,  # refills: 5   Last Office Visit: 1/9/2019   Future Office Visit:    Next 5 appointments (look out 90 days)    Aug 29, 2019  9:30 AM CDT  SHORT with Brian Del Rio RPH  [facility] ([facility]) [address] [facility] [address] [phone]             There is no refill protocol information for this order

## 2019-07-08 ENCOUNTER — MYC MEDICAL ADVICE (OUTPATIENT)
Dept: PHARMACY | Facility: CLINIC | Age: 59
End: 2019-07-08

## 2019-07-08 DIAGNOSIS — E11.65 UNCONTROLLED TYPE 2 DIABETES MELLITUS WITH HYPERGLYCEMIA (H): ICD-10-CM

## 2019-07-08 NOTE — TELEPHONE ENCOUNTER
7-8-19    Brian,  I have only one prescription for my Ozempic left. They cannot find how many refills I am supposed to have. Would it be possible to send the prescription to [facility] in [place] on [address]?    Thank you.    Areli kohli will refill today .

## 2019-07-23 DIAGNOSIS — E11.65 UNCONTROLLED TYPE 2 DIABETES MELLITUS WITH HYPERGLYCEMIA (H): ICD-10-CM

## 2019-07-23 NOTE — TELEPHONE ENCOUNTER
Pharm is requesting a 90 day supply.  Please send in a RX for 3 boxes. 1.5mL is 1 month supply.  Requested Prescriptions   Pending Prescriptions Disp Refills     semaglutide (OZEMPIC) 1 MG/DOSE pen 1.5 mL 5     Sig: Inject 1 mg Subcutaneous every 7 days   Last Written Prescription Date:  7/8/19  Last Fill Quantity: 1.5mL  ,  # refills: 5   Last Office Visit: 1/9/2019 [facility]      Return in about 3 months (around 4/9/2019) for Follow up, Med Check.     Future Office Visit:    Next 5 appointments (look out 90 days)    Aug 29, 2019  9:30 AM CDT  SHORT with Brian Del Rio RPH  [facility] ([facility]) [address] [facility] [address] [phone]             There is no refill protocol information for this order

## 2019-08-21 ENCOUNTER — TRANSFERRED RECORDS (OUTPATIENT)
Dept: HEALTH INFORMATION MANAGEMENT | Facility: CLINIC | Age: 59
End: 2019-08-21

## 2019-09-04 ENCOUNTER — ANCILLARY PROCEDURE (OUTPATIENT)
Dept: GENERAL RADIOLOGY | Facility: CLINIC | Age: 59
End: 2019-09-04
Attending: PHYSICIAN ASSISTANT
Payer: COMMERCIAL

## 2019-09-04 ENCOUNTER — OFFICE VISIT (OUTPATIENT)
Dept: FAMILY MEDICINE | Facility: CLINIC | Age: 59
End: 2019-09-04
Payer: COMMERCIAL

## 2019-09-04 VITALS
BODY MASS INDEX: 34.49 KG/M2 | WEIGHT: 207 LBS | OXYGEN SATURATION: 98 % | DIASTOLIC BLOOD PRESSURE: 68 MMHG | TEMPERATURE: 97.8 F | HEIGHT: 65 IN | HEART RATE: 78 BPM | RESPIRATION RATE: 16 BRPM | SYSTOLIC BLOOD PRESSURE: 108 MMHG

## 2019-09-04 DIAGNOSIS — M25.511 ACUTE PAIN OF RIGHT SHOULDER: Primary | ICD-10-CM

## 2019-09-04 DIAGNOSIS — E11.65 UNCONTROLLED TYPE 2 DIABETES MELLITUS WITH HYPERGLYCEMIA (H): ICD-10-CM

## 2019-09-04 DIAGNOSIS — M25.511 ACUTE PAIN OF RIGHT SHOULDER: ICD-10-CM

## 2019-09-04 PROCEDURE — 73030 X-RAY EXAM OF SHOULDER: CPT | Mod: RT

## 2019-09-04 PROCEDURE — 99214 OFFICE O/P EST MOD 30 MIN: CPT | Performed by: PHYSICIAN ASSISTANT

## 2019-09-04 ASSESSMENT — MIFFLIN-ST. JEOR: SCORE: 1524.83

## 2019-09-04 NOTE — PROGRESS NOTES
Subjective     Areli Stubbs is a 59 year old female who presents to clinic today for the following health issues:    HPI   Joint Pain    Onset: x 2 months    Description:   Location: right shoulder  Character: Sharp, Dull ache, Stabbing, Gnawing, Burning, Fullness and Cramping    Intensity: Currently 4, at worst 10/10    Progression of Symptoms: worse    Accompanying Signs & Symptoms:  Other symptoms: radiation of pain to down arm, numbness and weakness of hand and arm    History:   Previous similar pain: YES- left shoulder      Precipitating factors:   Trauma or overuse: YES- over use at work    Alleviating factors:  Improved by: rest/inactivity    Therapies Tried and outcome: Ibuprofen and rest, helpful    Patient here regarding right shoulder pain.  Left shoulder surgery (rotator cuff) 3 years ago with Dr. Miller- she said he mentioned at one point that he thought it could have been work related.  Now her right shoulder is hurting- she is now feeling the same progression of symptoms on this side.  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  Right handed.  Pain everyday, cannot sleep due to pain.  600mg Rx not helpful.  Using ice/heat which feels good but does not last.  Has tried chiro and accupuncture so far which worked for about a day or so.  She would like to continue with this.    PROBLEMS TO ADD ON...  Diabetes Follow-up      How often are you checking your blood sugar? One time daily    What time of day are you checking your blood sugars (select all that apply)?      Have you had any blood sugars above 200?  No    Have you had any blood sugars below 70?  No    What symptoms do you notice when your blood sugar is low?  None    What concerns do you have today about your diabetes? None     Do you have any of these symptoms? (Select all that apply)  No numbness or tingling in feet.  No redness, sores or blisters on feet.  No complaints of excessive thirst.  No reports of blurry vision.  No  significant changes to weight.     Have you had a diabetic eye exam in the last 12 months? Yes- Date of last eye exam: 2/2019    Diabetes Management Resources    Hyperlipidemia Follow-Up      Are you having any of the following symptoms? (Select all that apply)  No complaints of shortness of breath, chest pain or pressure.  No increased sweating or nausea with activity.  No left-sided neck or arm pain.  No complaints of pain in calves when walking 1-2 blocks.    Are you regularly taking any medication or supplement to lower your cholesterol?   No    Are you having muscle aches or other side effects that you think could be caused by your cholesterol lowering medication?      Hypertension Follow-up      Do you check your blood pressure regularly outside of the clinic? No     Are you following a low salt diet? Yes    Are your blood pressures ever more than 140 on the top number (systolic) OR more   than 90 on the bottom number (diastolic), for example 140/90? No    BP Readings from Last 2 Encounters:   09/04/19 108/68   04/25/19 108/60     Hemoglobin A1C (%)   Date Value   04/25/2019 5.7 (H)   01/09/2019 10.7 (H)     LDL Cholesterol Calculated (mg/dL)   Date Value   01/09/2019 77   03/28/2016 94       [facility]  [facility]  Ozempic only through us.  Stopped Lisinopril, still taking Lipitor.  Has lost 55 lbs so all numbers are much better.    Wt Readings from Last 4 Encounters:   09/04/19 93.9 kg (207 lb)   04/25/19 103.1 kg (227 lb 3.2 oz)   03/11/19 110.3 kg (243 lb 3.2 oz)   02/11/19 114.2 kg (251 lb 12.8 oz)     Reviewed and updated as needed this visit by Provider         Review of Systems   ROS COMP: Constitutional, HEENT, cardiovascular, pulmonary, gi and gu systems are negative, except as otherwise noted.      Objective    /68 (BP Location: Right arm, Patient Position: Sitting, Cuff Size: Adult Large)   Pulse 78   Temp 97.8  F (36.6  C) (Oral)   Resp 16   Ht 1.651 m  "(5' 5\")   Wt 93.9 kg (207 lb)   LMP 06/15/2006   SpO2 98%   BMI 34.45 kg/m    Body mass index is 34.45 kg/m .  Physical Exam   GENERAL: healthy, alert and no distress  MS: RUE exam shows decreased and painful ROM + empty can and pain with palpation of anterior shoulder.  SKIN: no suspicious lesions or rashes  NEURO: Normal strength and tone, mentation intact and speech normal  PSYCH: mentation appears normal, affect normal/bright    Diagnostic Test Results:  Labs reviewed in Epic  Xray - IMPRESSION: Moderate acromioclavicular degenerative changes. Small  calcification in the distal rotator cuff suspicious for small focus of  calcific tendinitis. No evidence of fracture or subluxation.        Assessment & Plan     1. Acute pain of right shoulder  Xray is showing degenerative changes in AC joint and some rotator cuff changes also.  This is similar to the problems she had on the left.  Would recommend Ortho referral.  - XR Shoulder Right G/E 3 Views; Future    2. Uncontrolled type 2 diabetes mellitus with hyperglycemia (H)  [facility]  [facility] getting most of diabetes care there now and also [facility].  Says she is doing mammo's, FIT there as well.       BMI:   Estimated body mass index is 34.45 kg/m  as calculated from the following:    Height as of this encounter: 1.651 m (5' 5\").    Weight as of this encounter: 93.9 kg (207 lb).   Weight management plan: Discussed healthy diet and exercise guidelines      Return in about 1 week (around 9/11/2019) for for Specialty appointment.    Elliott Askew PA-C  [facility]    "

## 2019-09-06 ENCOUNTER — TELEPHONE (OUTPATIENT)
Dept: FAMILY MEDICINE | Facility: CLINIC | Age: 59
End: 2019-09-06

## 2019-09-06 DIAGNOSIS — M25.511 RIGHT SHOULDER PAIN, UNSPECIFIED CHRONICITY: Primary | ICD-10-CM

## 2019-09-06 NOTE — TELEPHONE ENCOUNTER
I most certainly thought I sent her a [employer] message..... I remember writing it!  I wonder if I didn't save it??  Shoot.  I can see a pended order for Ortho even on 9/4/19?? She needs Ortho again.  (maybe I need to re-do the order)  There is arthritis in this shoulder as well.  Just like the other one-- at the AC joint.      Let me know if I should re-do the order.      Elliott

## 2019-09-06 NOTE — TELEPHONE ENCOUNTER
IMPRESSION: Moderate acromioclavicular degenerative changes. Small  calcification in the distal rotator cuff suspicious for small focus of  calcific tendinitis. No evidence of fracture or subluxation.

## 2019-09-07 NOTE — TELEPHONE ENCOUNTER
Patient notified and referral placed.  Patient states that she will call ortho on Monday and schedule an appointment.    Sabine Eden RN

## 2019-09-10 ENCOUNTER — OFFICE VISIT (OUTPATIENT)
Dept: ORTHOPEDICS | Facility: CLINIC | Age: 59
End: 2019-09-10
Payer: COMMERCIAL

## 2019-09-10 VITALS
HEIGHT: 65 IN | DIASTOLIC BLOOD PRESSURE: 74 MMHG | WEIGHT: 208 LBS | SYSTOLIC BLOOD PRESSURE: 132 MMHG | BODY MASS INDEX: 34.66 KG/M2

## 2019-09-10 DIAGNOSIS — M75.31 CALCIFIC TENDINITIS OF RIGHT SHOULDER: Primary | ICD-10-CM

## 2019-09-10 PROCEDURE — 99214 OFFICE O/P EST MOD 30 MIN: CPT | Performed by: ORTHOPAEDIC SURGERY

## 2019-09-10 RX ORDER — MELOXICAM 15 MG/1
15 TABLET ORAL DAILY
Qty: 30 TABLET | Refills: 1 | Status: SHIPPED | OUTPATIENT
Start: 2019-09-10

## 2019-09-10 ASSESSMENT — MIFFLIN-ST. JEOR: SCORE: 1529.36

## 2019-09-10 NOTE — LETTER
9/10/2019         RE: Areli Stubbs  [address] [facility] [address]        Dear Colleague,    Thank you for referring your patient, Areli Stubbs, to the [facility] ORTHOPEDIC SURGERY. Please see a copy of my visit note below.    CHIEF COMPLAINT: Right shoulder pain    DIAGNOSIS: Right shoulder calcific tendinitis    OCCUPATION/SPORT: Facilities Assigner, computer and office work    HPI:   Areli Stubbs is a 59 year old, right-hand dominant female who presents for evaluation of right shoulder pain. This is a worker's compensation claim. She first noted the pain in July of 2019, with progressive to severe worsening of the pain over the last 1-2 months. She denies any history of trauma. She reports that the pain is secondary to repetitive keyboard and mouse work. Pain is located anterolaterally about the right shoulder, and will radiate into the upper arm. Pain is sharp, dull, and aching. She denies numbness and tingling radiating down the arm. Increased pain with use of the right arm, and with reaching behind the back, reaching forward, raising the arm both overhead and laterally. She is currently sleeping in a chair due to increased pain with side lying on the right shoulder, and waking due to night time pain.  Patient is unsure if pain is limiting her function, or if she has true weakness of the right arm.  Patient is Type 2 Diabetic with recent A1C: 5.7 on 4/25/19. Also of note, she had a previous left shoulder mini-open rotator cuff repair by Dr. Wallace Miller on 2/14/2017, and she reports that she is doing well from that now, but does feel like it took a very long time to recover.    Current pain level: 3/10, worst pain level: 10/10.     Current treatments: ice, heat, ibuprofen 600 mg Q4-6 hours, and home exercises from previous left shoulder surgery that she is broadly applying to her right shoulder with limited relief from all modalities.    After reviewing the history, physical exam, and  "imaging, she has right shoulder calcific tendinitis and is interested in maximizing her non-operative treatment options at this time.     PAST MEDICAL HISTORY:  1. Type II diabetes mellitus, recent A1C: 5.7 on 4/25/19  2. Osteoarthritis  3. Hyperlipidemia  4. GERD  5. Insomnia  6. Hypertension    CURRENT MEDICATIONS:  1. Atorvastatin  2. Ranitidine  3. Semaglutide  4. Aspirin  5. Metformin  6. Ibuprofen PRN    ALLERGIES:   Adhesive tape, Sulfa drugs.    PAST SURGICAL HISTORY:  1. Left shoulder mini-open rotator cuff repair with Dr. Wallace Miller on 2/14/2017  2. Right total knee arthroplasty on 6/4/2012  3. Bilateral carpal tunnel release  4. Right DeQuervain's release  5. Left knee arthroscopy  6. Total hysterectomy    SOCIAL HISTORY: She is . 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    FAMILY HISTORY: No known family history of bleeding, clotting, or anesthesia related complications.     TOXIC HABITS:  I spoke with Areli today regarding tobacco use and they informed me that they     REVIEW OF SYSTEMS:  Constitutional: Normal  Otolaryngeal: Normal  Cardiovascular: Positive for hypertension. Otherwise normal.  Pulmonary: Normal  Gastrointestinal: Reflux, otherwise normal  Genitourinary: Normal  Musculoskeletal: As noted above in the HPI, otherwise normal  Skin: Normal  Neurological: Normal  Psychiatric: Normal  Endocrine: Normal  Hematologic: Normal    PHYSICAL EXAM:  She is 5' 5\" and weighs 208 lbs 0 oz. /74 (BP Location: Right arm, Patient Position: Chair, Cuff Size: Adult Large)   Ht 1.651 m (5' 5\")   Wt 94.3 kg (208 lb)   LMP 06/15/2006   BMI 34.61 kg/m     Constitutional: Well-developed, well-nourished, healthy appearing female.  Skin: Warm, dry, and without rashes. Well healed left shoulder mini-open rotator cuff repair incision.  HEENT: Normal.  Cardiac: Well perfused extremities, strong 2+ peripheral pulses. No edema.   Pulmonary: " Non-labored respirations on room air without audible wheezes.   Abdomen: Soft, nontender.  Musculoskeletal: Patient ambulates with a slow, symmetric, and steady gait. Active range of motion of the right shoulder is 170/80/90/L2 compared to 170/80/90/T7 on the left. Right shoulder has positive Neer, Howard, and Izzy, negative on the left side. No discernable weakness with forward elevation, external rotation, or internal rotation of the shoulders bilaterally. No AC, Sternoclavicular, biceps signs, cross-body adduction, atrophy, deformity, belly-press, lift-off, external rotation lag sign, internal rotation lag sign, hornblower's bilaterally. No generalized ligamentous laxity. Neurovascular exam and cervical spine exam are normal.    IMAGING:   Right shoulder plain radiographs notable for a small area of calcific tendinitis within the supraspinatus insertion (~2.0 x 6.0 mm). AC joint degenerative changes present.     IMPRESSION: 59 year old-year-old right hand dominant female, with right shoulder calcific tendinitis. Differential diagnosis includes: rotator cuff tear (good strength on exam making a full thickness tear highly unlikely), biceps tendinoplasty (quiet on exam today), AC joint arthrosis (asymptomatic on exam).    PLAN:   I discussed the treatment options with Areli includin. Living with the symptoms.  2. Continued non-operative management.  3. Surgical intervention.     I reviewed the imaging with Areli today and explained the natural history and progression of calcific tendinitis of the shoulder. This is a self-limiting disease and fewer than 1 in 10 patients will ultimately require surgery for this problem. It is my current recommendation that we maximize non-operative treatment modalities for this problem. With shared decision making, Areli expressed that she is very adverse to corticosteroid injections due to bad experiences that she has had in the past with injections for her knee (elevated  blood sugars, and side effects). As such, I recommend switching her anti-inflammatory medication to Meloxicam to see if that helps. Also, I recommend a brief course of Physical Therapy with one of our shoulder experts at [facility] in [place], [place]. Lastly, Areli strongly desired to pursue chiropractic and acupuncture care for her right shoulder as she has had excellent luck with this in the past. I am happy to place that order for her today to maximize her non-operative treatments. We will schedule her for a follow-up visit in ~8 weeks, and I explained that if her symptoms are improving and she is satisfied with her progression, she may call and cancel that appointment at her discretion. If things worsen in the interim, she may call sooner to  her appointment up.     1. Meloxicam 15 mg daily with meals, 30 tabs, 1 refill. Discontinue Ibuprofen while taking Meloxicam.  2. Physical therapy and HEP for right shoulder calcific tendinitis.  3. Chirorpractor referral for right shoulder pain.  4. Return to clinic in 8 weeks with no x-rays needed. If symptoms fail to improve, we will consider an MRI of the right shoulder or re-visiting a corticosteroid injection at that visit.     At the conclusion of the office visit, Areli verbally acknowledged that I answered all of her questions satisfactorily.    Again, thank you for allowing me to participate in the care of your patient.        Sincerely,        Rios Martínez MD

## 2019-09-10 NOTE — PROGRESS NOTES
CHIEF COMPLAINT: Right shoulder pain    DIAGNOSIS: Right shoulder calcific tendinitis    OCCUPATION/SPORT: Facilities Assigner, computer and office work    HPI:   Areli Stubbs is a 59 year old, right-hand dominant female who presents for evaluation of right shoulder pain. This is a worker's compensation claim. She first noted the pain in July of 2019, with progressive to severe worsening of the pain over the last 1-2 months. She denies any history of trauma. She reports that the pain is secondary to repetitive keyboard and mouse work. Pain is located anterolaterally about the right shoulder, and will radiate into the upper arm. Pain is sharp, dull, and aching. She denies numbness and tingling radiating down the arm. Increased pain with use of the right arm, and with reaching behind the back, reaching forward, raising the arm both overhead and laterally. She is currently sleeping in a chair due to increased pain with side lying on the right shoulder, and waking due to night time pain.  Patient is unsure if pain is limiting her function, or if she has true weakness of the right arm.  Patient is Type 2 Diabetic with recent A1C: 5.7 on 4/25/19. Also of note, she had a previous left shoulder mini-open rotator cuff repair by Dr. Wallace Miller on 2/14/2017, and she reports that she is doing well from that now, but does feel like it took a very long time to recover.    Current pain level: 3/10, worst pain level: 10/10.     Current treatments: ice, heat, ibuprofen 600 mg Q4-6 hours, and home exercises from previous left shoulder surgery that she is broadly applying to her right shoulder with limited relief from all modalities.    After reviewing the history, physical exam, and imaging, she has right shoulder calcific tendinitis and is interested in maximizing her non-operative treatment options at this time.     PAST MEDICAL HISTORY:  1. Type II diabetes mellitus, recent A1C: 5.7 on  "4/25/19  2. Osteoarthritis  3. Hyperlipidemia  4. GERD  5. Insomnia  6. Hypertension    CURRENT MEDICATIONS:  1. Atorvastatin  2. Ranitidine  3. Semaglutide  4. Aspirin  5. Metformin  6. Ibuprofen PRN    ALLERGIES:   Adhesive tape, Sulfa drugs.    PAST SURGICAL HISTORY:  1. Left shoulder mini-open rotator cuff repair with Dr. Wallace Miller on 2/14/2017  2. Right total knee arthroplasty on 6/4/2012  3. Bilateral carpal tunnel release  4. Right DeQuervain's release  5. Left knee arthroscopy  6. Total hysterectomy    SOCIAL HISTORY: She is . 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    FAMILY HISTORY: No known family history of bleeding, clotting, or anesthesia related complications.     TOXIC HABITS:  I spoke with Areli today regarding tobacco use and they informed me that they     REVIEW OF SYSTEMS:  Constitutional: Normal  Otolaryngeal: Normal  Cardiovascular: Positive for hypertension. Otherwise normal.  Pulmonary: Normal  Gastrointestinal: Reflux, otherwise normal  Genitourinary: Normal  Musculoskeletal: As noted above in the HPI, otherwise normal  Skin: Normal  Neurological: Normal  Psychiatric: Normal  Endocrine: Normal  Hematologic: Normal    PHYSICAL EXAM:  She is 5' 5\" and weighs 208 lbs 0 oz. /74 (BP Location: Right arm, Patient Position: Chair, Cuff Size: Adult Large)   Ht 1.651 m (5' 5\")   Wt 94.3 kg (208 lb)   LMP 06/15/2006   BMI 34.61 kg/m    Constitutional: Well-developed, well-nourished, healthy appearing female.  Skin: Warm, dry, and without rashes. Well healed left shoulder mini-open rotator cuff repair incision.  HEENT: Normal.  Cardiac: Well perfused extremities, strong 2+ peripheral pulses. No edema.   Pulmonary: Non-labored respirations on room air without audible wheezes.   Abdomen: Soft, nontender.  Musculoskeletal: Patient ambulates with a slow, symmetric, and steady gait. Active range of motion of the right shoulder is " 170/80/90/L2 compared to 170/80/90/T7 on the left. Right shoulder has positive Neer, Howard, and Izzy, negative on the left side. No discernable weakness with forward elevation, external rotation, or internal rotation of the shoulders bilaterally. No AC, Sternoclavicular, biceps signs, cross-body adduction, atrophy, deformity, belly-press, lift-off, external rotation lag sign, internal rotation lag sign, hornblower's bilaterally. No generalized ligamentous laxity. Neurovascular exam and cervical spine exam are normal.    IMAGING:   Right shoulder plain radiographs notable for a small area of calcific tendinitis within the supraspinatus insertion (~2.0 x 6.0 mm). AC joint degenerative changes present.     IMPRESSION: 59 year old-year-old right hand dominant female, with right shoulder calcific tendinitis. Differential diagnosis includes: rotator cuff tear (good strength on exam making a full thickness tear highly unlikely), biceps tendinoplasty (quiet on exam today), AC joint arthrosis (asymptomatic on exam).    PLAN:   I discussed the treatment options with Areli includin. Living with the symptoms.  2. Continued non-operative management.  3. Surgical intervention.     I reviewed the imaging with Areli today and explained the natural history and progression of calcific tendinitis of the shoulder. This is a self-limiting disease and fewer than 1 in 10 patients will ultimately require surgery for this problem. It is my current recommendation that we maximize non-operative treatment modalities for this problem. With shared decision making, Areli expressed that she is very adverse to corticosteroid injections due to bad experiences that she has had in the past with injections for her knee (elevated blood sugars, and side effects). As such, I recommend switching her anti-inflammatory medication to Meloxicam to see if that helps. Also, I recommend a brief course of Physical Therapy with one of our shoulder experts  at [facility] in [place], [place]. Lastly, Areli strongly desired to pursue chiropractic and acupuncture care for her right shoulder as she has had excellent luck with this in the past. I am happy to place that order for her today to maximize her non-operative treatments. We will schedule her for a follow-up visit in ~8 weeks, and I explained that if her symptoms are improving and she is satisfied with her progression, she may call and cancel that appointment at her discretion. If things worsen in the interim, she may call sooner to  her appointment up.     1. Meloxicam 15 mg daily with meals, 30 tabs, 1 refill. Discontinue Ibuprofen while taking Meloxicam.  2. Physical therapy and HEP for right shoulder calcific tendinitis.  3. Chirorpractor referral for right shoulder pain.  4. Return to clinic in 8 weeks with no x-rays needed. If symptoms fail to improve, we will consider an MRI of the right shoulder or re-visiting a corticosteroid injection at that visit.     At the conclusion of the office visit, Areli verbally acknowledged that I answered all of her questions satisfactorily.

## 2019-09-10 NOTE — PATIENT INSTRUCTIONS
Chiropractic referral was placed for [facility] in [place], drop off the referral.   Physical therapy was ordered with [facility], they will call you to schedule.     Follow up with Dr. Martínez in 8 weeks for reevaluation.

## 2019-09-11 ENCOUNTER — TELEPHONE (OUTPATIENT)
Dept: ORTHOPEDICS | Facility: CLINIC | Age: 59
End: 2019-09-11

## 2019-09-11 NOTE — TELEPHONE ENCOUNTER
Areli Stubbs is a 59 year old female who left voicemail stating she saw Dr. Martínez yesterday regarding her right shoulder. She needs to know if this will be considered under work comp, as repetitive motion injury. If it is not considered work comp, she will not be able to proceed with the Chiropractor and physical therapy referrals.     Phone call to patient. She has filed a work comp claim through [employer]. She does have private insurance, but due to financial hardship, she will not be able pay her portion that would be needed for physical therapy and chiropractic care. Asked if work comp had asked for more information, and she states they will most likely be asking for medical records in the future. Informed we do need a request in writing.   She is trying to plan her care going forward and wants to know what Dr. Martínez decided.   Informed he is out of the office until Friday. Will discuss with him and get back with her at that time.     Patient expecting call back: YES      Preferred contact number:  [phone]   Can we leave a detailed message on this number: YES     Please advise.     JUSTO Gaming RN

## 2019-09-13 NOTE — TELEPHONE ENCOUNTER
Per Dr. 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 It is between work comp and patient of what they decided. He is happy to complete any additional paperwork that is needed.     Phone call to patient and informed of the above. She states she is waiting to hear back from work comp and was appreciative of return call.     JUSTO Gaming RN

## 2019-09-17 RX ORDER — LORATADINE 10 MG
TABLET ORAL
Qty: 100 EACH | Refills: 3 | Status: SHIPPED | OUTPATIENT
Start: 2019-09-17

## 2019-09-17 NOTE — TELEPHONE ENCOUNTER
Requested Prescriptions   Pending Prescriptions Disp Refills     Alcohol Swabs ([facility] PREP) 70 % PADS [Pharmacy Med Name: [facility] ALCOHOL 70% PREP PADS]  3     Sig: USE TO SWAB AREA OF INJECTION/TODD AS DIRECTED.   Last Written Prescription Date:  1/10/19  Last Fill Quantity: 100,  # refills: 3   Last Office Visit: 9/4/2019   Future Office Visit:         There is no refill protocol information for this order

## 2019-09-18 ENCOUNTER — TELEPHONE (OUTPATIENT)
Dept: FAMILY MEDICINE | Facility: CLINIC | Age: 59
End: 2019-09-18

## 2019-09-18 NOTE — TELEPHONE ENCOUNTER
Prescription approved per [facility], [facility] or [facility] refill protocol.  Lori SPICER RN   [facility]

## 2019-09-18 NOTE — TELEPHONE ENCOUNTER
Work comp manager Mario calling:  Phone: [phone]  Fax: [phone]    Needing information from Ortho referral and ortho visit faxed to him.     Advised medical records who stated patient needs to sign a release of information form so that these records can be released at this time.     Advised Mario on the phone number and fax number of [facility] at this time.     Mario expressed understanding and acceptance of the plan and had no further questions at this time.  Advised can call back to clinic at any time with concerns.     Melody Reynoso RN Flex

## 2019-11-04 ENCOUNTER — HEALTH MAINTENANCE LETTER (OUTPATIENT)
Age: 59
End: 2019-11-04

## 2019-12-02 ENCOUNTER — TELEPHONE (OUTPATIENT)
Dept: FAMILY MEDICINE | Facility: CLINIC | Age: 59
End: 2019-12-02

## 2019-12-02 NOTE — LETTER
[address], [address]  [facility] [address] [phone]  December 9, 2019    Areli Stubbs  [address] [place] [id number]      Dear Areli,    I care about your health and have reviewed your health plan.  I have reviewed your medical conditions, medication list, and lab results and am making recommendations  based on this review, to better manage your health.    You are particularly in need of attention regarding:  -Depression, -Breast Cancer Screening, -Colon Cancer Screening and -Wellness (Physical) Visit    I am recommending that you:  -schedule a WELLNESS (Physical) APPOINTMENT with me.   I will check fasting labs the same day - nothing to eat except water and meds for 8-10 hours prior.  -schedule a MAMMOGRAM which is due. We have Mammogram available at the [facility] location Tuesday AM, Wednesday PM, Thursday all day, and every other Saturday, to schedule call [phone]. Please disregard this reminder if you have had this exam elsewhere within the last year.  It would be helpful for us to have a copy of your mammogram report in our file so that we can best coordinate your care.  -schedule a COLONOSCOPY to look for colon cancer (due every 10 years or 5 years in higher risk situations.)   Colon cancer is now the second leading cause of death in the [place] for both men and women and there are over 130,000 new cases and 50,000 deaths per year from colon cancer.  Colonoscopies can prevent 90-95% of these deaths.  Problem lesions can be removed before they ever become cancer.  This test is not only looking for cancer, but also getting rid of precancerious lesions.  If you do not wish to do a colonoscopy or cannot afford to do one, at this time, there is another option. It is called a FIT test or Fecal Immunochemical Occult Blood Test (take home stool sample kit).  It does not replace the colonoscopy for colorectal cancer screening, but it can  detect hidden bleeding in the lower colon.  It does need to be repeated every year and if a positive result is obtained, you would be referred for a colonoscopy.  If you have completed either one of these tests at another facility, please have the records sent to our clinic so that we can best coordinate your care.      Here is a list of Health Maintenance topics that are due now or due soon:  Health Maintenance Due   Topic Date Due     PREVENTIVE CARE VISIT  1960     ADVANCE CARE PLANNING  1960     HIV SCREENING  02/05/1977     PNEUMOCOCCAL IMMUNIZATION 19-64 MEDIUM RISK (1 of 1 - PPSV23) 02/05/1981     FIT  06/25/2014     ZOSTER IMMUNIZATION (2 of 3) 10/28/2015     BMP  01/25/2018     MAMMO SCREENING  10/01/2018     PHQ-9  07/09/2019     A1C  07/25/2019     INFLUENZA VACCINE (1) 09/01/2019     LIPID  01/09/2020     MICROALBUMIN  01/09/2020     DIABETIC FOOT EXAM  01/09/2020       Please call us at [phone] (or use [employer]) to address the above   recommendations.    Thank you for trusting [facility] and we appreciate the opportunity to serve you.  We look forward to supporting your healthcare needs in the future.    Healthy Regards,    Elliott GRESHAM

## 2019-12-02 NOTE — TELEPHONE ENCOUNTER
Panel Management Review      Patient has the following on her problem list:     Depression / Dysthymia review    Measure:  Needs PHQ-9 score of 4 or less during index window.  Administer PHQ-9 and if score is 5 or more, send encounter to provider for next steps.    5 - 7 month window range: 11/10/2019 - 3/9/2020    PHQ-9 SCORE 3/28/2016 1/25/2017 1/9/2019   PHQ-9 Total Score - - -   PHQ-9 Total Score 6 4 12       If PHQ-9 recheck is 5 or more, route to provider for next steps.    Patient is due for:  PHQ9      Composite cancer screening  Chart review shows that this patient is due/due soon for the following Mammogram and Fecal Colorectal (FIT)  Summary:    Patient is due/failing the following:   FIT, MAMMOGRAM, PHQ9 and PHYSICAL    Action needed:   Patient needs office visit for physical and depression follow up also schedule mammogram and complete FIT test.    Type of outreach:    Sent [employer] message.    Questions for provider review:    None                                                                                                                                    Brenna Miller       Chart routed to Care Team .

## 2020-01-11 ENCOUNTER — TRANSFERRED RECORDS (OUTPATIENT)
Dept: MULTI SPECIALTY CLINIC | Facility: CLINIC | Age: 60
End: 2020-01-11

## 2020-02-13 NOTE — TELEPHONE ENCOUNTER
Prescription approved per [facility] Refill Protocol.  Sabine Eden RN

## 2020-02-16 ENCOUNTER — HEALTH MAINTENANCE LETTER (OUTPATIENT)
Age: 60
End: 2020-02-16

## 2020-02-16 ENCOUNTER — HOSPITAL ENCOUNTER (EMERGENCY)
Facility: CLINIC | Age: 60
Discharge: HOME | End: 2020-02-16
Attending: PHYSICIAN ASSISTANT | Admitting: PHYSICIAN ASSISTANT
Payer: COMMERCIAL

## 2020-02-16 VITALS
DIASTOLIC BLOOD PRESSURE: 87 MMHG | BODY MASS INDEX: 33.32 KG/M2 | RESPIRATION RATE: 16 BRPM | WEIGHT: 200 LBS | HEART RATE: 106 BPM | HEIGHT: 65 IN | TEMPERATURE: 98.2 F | OXYGEN SATURATION: 100 % | SYSTOLIC BLOOD PRESSURE: 148 MMHG

## 2020-02-16 DIAGNOSIS — J11.1 INFLUENZA-LIKE ILLNESS: ICD-10-CM

## 2020-02-16 LAB
DEPRECATED S PYO AG THROAT QL EIA: NORMAL
FLUAV+FLUBV AG SPEC QL: NEGATIVE
FLUAV+FLUBV AG SPEC QL: NEGATIVE
SPECIMEN SOURCE: NORMAL
SPECIMEN SOURCE: NORMAL

## 2020-02-16 PROCEDURE — 87804 INFLUENZA ASSAY W/OPTIC: CPT | Mod: 59 | Performed by: PHYSICIAN ASSISTANT

## 2020-02-16 PROCEDURE — 87081 CULTURE SCREEN ONLY: CPT | Performed by: PHYSICIAN ASSISTANT

## 2020-02-16 PROCEDURE — 25000132 ZZH RX MED GY IP 250 OP 250 PS 637: Performed by: PHYSICIAN ASSISTANT

## 2020-02-16 PROCEDURE — 87880 STREP A ASSAY W/OPTIC: CPT | Performed by: PHYSICIAN ASSISTANT

## 2020-02-16 PROCEDURE — 99283 EMERGENCY DEPT VISIT LOW MDM: CPT

## 2020-02-16 RX ORDER — IBUPROFEN 600 MG/1
600 TABLET, FILM COATED ORAL ONCE
Status: COMPLETED | OUTPATIENT
Start: 2020-02-16 | End: 2020-02-16

## 2020-02-16 RX ORDER — OSELTAMIVIR PHOSPHATE 75 MG/1
75 CAPSULE ORAL 2 TIMES DAILY
Qty: 10 CAPSULE | Refills: 0 | Status: SHIPPED | OUTPATIENT
Start: 2020-02-16 | End: 2020-02-21

## 2020-02-16 RX ADMIN — IBUPROFEN 600 MG: 600 TABLET, FILM COATED ORAL at 22:30

## 2020-02-16 ASSESSMENT — ENCOUNTER SYMPTOMS
FATIGUE: 1
SORE THROAT: 1
HEADACHES: 1
CHILLS: 1
TROUBLE SWALLOWING: 1

## 2020-02-16 ASSESSMENT — MIFFLIN-ST. JEOR: SCORE: 1488.07

## 2020-02-16 NOTE — ED AVS SNAPSHOT
[facility]  [address]  [facility] [address]  Phone:  [phone]  Fax:  [phone]                                    Areli Stubbs   MRN: [id number]    Department:  [facility] Department   Date of Visit:  2/16/2020           After Visit Summary Signature Page    I have received my discharge instructions, and my questions have been answered. I have discussed any challenges I see with this plan with the nurse or doctor.    ..........................................................................................................................................  Patient/Patient Representative Signature      ..........................................................................................................................................  Patient Representative Print Name and Relationship to Patient    ..................................................               ................................................  Date                                   Time    ..........................................................................................................................................  Reviewed by Signature/Title    ...................................................              ..............................................  Date                                               Time          22EPIC Rev 08/18

## 2020-02-17 NOTE — ED PROVIDER NOTES
History     Chief Complaint:  Pharyngitis    HPI   Areli Stubbs is a 60 year old female who presents to the emergency department for evaluation of pharyngitis.  Sore throat started approximately 3 days ago.  Tonight she reports significant worsening to the point she is having difficulty swallowing.  She reports associated headache, congestion, chest tightness, hoarseness, chills, and fatigue.  She states she did get her flu shot this year.  She last took ibuprofen at 1400.  She denies any ear pain.    Allergies:  Adhesive Tape  Sulfa Drugs     Medications:    oseltamivir (TAMIFLU) 75 MG capsule  Alcohol Swabs ([employer] PREP) 70 % PADS  ASPIRIN NOT PRESCRIBED (INTENTIONAL)  atorvastatin (LIPITOR) 10 MG tablet  blood glucose (ONETOUCH VERIO IQ) test strip  ibuprofen (ADVIL,MOTRIN) 600 MG tablet  meloxicam (MOBIC) 15 MG tablet  metFORMIN (GLUCOPHAGE-XR) 750 MG 24 hr tablet  semaglutide (OZEMPIC) 1 MG/DOSE pen        Past Medical History:    Past Medical History:   Diagnosis Date     Allergic rhinitis due to other allergen      Arthritis      Diabetes type 2, uncontrolled (H) 3/31/2016     Esophageal reflux      Gastro-oesophageal reflux disease      Hyperlipidemia LDL goal <160 3/28/2016     Insomnia, unspecified insomnia 3/28/2016     Obesity, unspecified      Pain in joint, lower leg 12/10/2006     Posterior vitreous detachment 2014     Type 2 diabetes mellitus without complication (H) 3/28/2016       Patient Active Problem List    Diagnosis Date Noted     Pain in joint of left shoulder 03/13/2017     Priority: Medium     Aftercare following surgery of the musculoskeletal system 03/13/2017     Priority: Medium     Sprain of left rotator cuff capsule, initial encounter 02/01/2017     Priority: Medium     Obesity, Class III, BMI 40-49.9 (morbid obesity) (H) 04/12/2016     Priority: Medium     Intertrigo 04/12/2016     Priority: Medium     Diabetes type 2, uncontrolled (H) 03/31/2016     Priority: Medium     Insomnia,  unspecified insomnia 03/28/2016     Priority: Medium     Hyperlipidemia LDL goal <160 03/28/2016     Priority: Medium     5.8% 10 yr risk 2016         HTN, goal below 140/90 10/01/2013     Priority: Medium     Mild major depression (H) 08/28/2013     Priority: Medium     S/P TKR (total knee replacement): 6/4/12 06/08/2012     Priority: Medium     Anemia due to blood loss, acute 06/08/2012     Priority: Medium     Physical deconditioning 06/08/2012     Priority: Medium     Oral thrush 06/08/2012     Priority: Medium     Anxiety state 01/14/2004     Priority: Medium     Problem list name updated by automated process. Provider to review       Obesity 01/14/2004     Priority: Medium     Problem list name updated by automated process. Provider to review       Osteoarthritis of ankle and foot 01/14/2004     Priority: Medium        Past Surgical History:    Past Surgical History:   Procedure Laterality Date     ARTHROPLASTY KNEE  6/4/2012    Procedure:ARTHROPLASTY KNEE; Right Total Knee Arthroplasty       ARTHROSCOPY KNEE      left     ARTHROSCOPY SHOULDER, OPEN ROTATOR CUFF REPAIR, COMBINED Left 2/14/2017    Procedure: COMBINED ARTHROSCOPY SHOULDER, OPEN ROTATOR CUFF REPAIR;  Surgeon: Wallace Miller MD;  Location: [facility]     C NONSPECIFIC PROCEDURE  1991    tubal ligation     CARPAL TUNNEL RELEASE RT/LT      bilateral     DENTAL SURGERY       HYSTERECTOMY TOTAL ABDOMINAL, BILATERAL SALPINGO-OOPHORECTOMY, COMBINED      benign     right hand surgery      tendon realease        Family History:    family history includes Breast Cancer in her maternal aunt and maternal grandmother; Cancer in her maternal grandfather; Diabetes in her father and paternal grandmother; Hypertension in her father and paternal grandmother.    Social History:   reports that she quit smoking about 6 years ago. Her smoking use included cigarettes. She smoked 0.50 packs per day. She has never used smokeless tobacco. She reports current alcohol use.  "She reports that she does not use drugs.    PCP: Soni Torres     Review of Systems   Constitutional: Positive for chills and fatigue.   HENT: Positive for congestion, sore throat and trouble swallowing.    Neurological: Positive for headaches.   All other systems reviewed and are negative.        Physical Exam     Patient Vitals for the past 24 hrs:   BP Temp Temp src Pulse Resp SpO2 Height Weight   02/16/20 2146 (!) 148/87 98.2  F (36.8  C) Oral 106 16 100 % 1.651 m (5' 5\") 90.7 kg (200 lb)        Physical Exam  Constitutional: Pleasant. Cooperative.   Eyes: Pupils equally round and reactive  HENT: Head is normal in appearance. TMs normal. Moist mucous membranes. Oropharyngeal erythema. No exudates. No palatal asymmetry. Uvula midline. No trismus. No muffled voice. Tolerating their secretions.  Cardiovascular: Regular rate and rhythm.  Respiratory: Normal respiratory effort, lungs are clear bilaterally.  Neck: Normal ROM. Anterior cervical lymphadenopathy.  Skin: Normal, without rash.  Neurologic: Cranial nerves grossly intact. Alert.  Nursing notes and vital signs reviewed.      Emergency Department Course     Laboratory:  Labs Ordered and Resulted from Time of ED Arrival Up to the Time of Departure from the ED   INFLUENZA A/B ANTIGEN   RAPID STREP SCREEN   BETA STREP GROUP A CULTURE      Interventions:  Medications   ibuprofen (ADVIL/MOTRIN) tablet 600 mg (600 mg Oral Given 2/16/20 2230)        Emergency Department Course:  Past medical records, nursing notes, and vitals reviewed.  I performed an exam of the patient and obtained history, as documented above.  I ordered the above labs.    Ordered the above interventions.  Discussed results with patient.  Patient discharged home.    Impression & Plan      Medical Decision Making:  Areli Stubbs is a 60 year old female who presents for evaluation of sore throat, congestion, chills, and headache.  This is consistent with an influenza like illness. Rapid " swabs for influenza and strep obtained and returned negative. Strep culture pending.  Discussed with patient sensitivity of flu swab.  As patient had significant worsening today and she is a diabetic, will initiate patient on Tamiflu. She is at risk for pneumonia but no signs of this are detected on today's visit.  Close followup of primary care physician is indicated and return to the ED for high fevers > 103 for more than 48 hours more, increasing productive cough, shortness of breath, or confusion.  There is no signs of serious bacterial infection such as bacteremia, meningitis, UTI/pyelonephritis, etc.  Discussed reasons to return. All questions answered. Patient discharged to home in stable condition.    Diagnosis:    ICD-10-CM    1. Influenza-like illness R69         Discharge Medications:  Discharge Medication List as of 2/16/2020 11:07 PM      START taking these medications    Details   oseltamivir (TAMIFLU) 75 MG capsule Take 1 capsule (75 mg) by mouth 2 times daily for 5 days, Disp-10 capsule, R-0, Local Print           There  2/16/2020   Sandeep Rock PA-C Steele, Ryan, PA-C  02/16/20 2486

## 2020-02-17 NOTE — ED TRIAGE NOTES
Patient comes in for evaluation of cough, congestion, sore throat, and headache since Thursday with no improvement. ABCs intact.

## 2020-02-19 LAB
BACTERIA SPEC CULT: NORMAL
Lab: NORMAL
SPECIMEN SOURCE: NORMAL

## 2020-02-19 NOTE — RESULT ENCOUNTER NOTE
Final Beta strep group A r/o culture is NEGATIVE for Group A streptococcus.    No treatment or change in treatment per [place] Strep protocol.

## 2020-02-24 ENCOUNTER — OFFICE VISIT (OUTPATIENT)
Dept: URGENT CARE | Facility: URGENT CARE | Age: 60
End: 2020-02-24
Payer: COMMERCIAL

## 2020-02-24 VITALS
DIASTOLIC BLOOD PRESSURE: 78 MMHG | HEART RATE: 87 BPM | WEIGHT: 203 LBS | OXYGEN SATURATION: 96 % | SYSTOLIC BLOOD PRESSURE: 116 MMHG | BODY MASS INDEX: 33.78 KG/M2 | TEMPERATURE: 98 F

## 2020-02-24 DIAGNOSIS — J01.90 ACUTE SINUSITIS WITH SYMPTOMS > 10 DAYS: Primary | ICD-10-CM

## 2020-02-24 PROCEDURE — 99213 OFFICE O/P EST LOW 20 MIN: CPT | Performed by: FAMILY MEDICINE

## 2020-02-24 NOTE — PROGRESS NOTES
Subjective:   Areli Stubbs is a 60 year old female who presents for   Chief Complaint   Patient presents with     Urgent Care     Sinus Problem     X 11 days, cough, davis eyes, crusty eye, went to ER on 16 Flu and strep negitive     No hx of recurrent sinus infections. Vision is fine. Slight crustiness/discharge of left eye.   Cough is bothersome but is not as frequent as before. Pressure of maxillary and frontal sinuses  She denies any fevers.     Meds attempted: sudafed, ibuprofen, previously was using afrin  Previous smoker quit - 6.5 years ago    Patient Active Problem List    Diagnosis Date Noted     Pain in joint of left shoulder 03/13/2017     Priority: Medium     Aftercare following surgery of the musculoskeletal system 03/13/2017     Priority: Medium     Sprain of left rotator cuff capsule, initial encounter 02/01/2017     Priority: Medium     Obesity, Class III, BMI 40-49.9 (morbid obesity) (H) 04/12/2016     Priority: Medium     Intertrigo 04/12/2016     Priority: Medium     Diabetes type 2, uncontrolled (H) 03/31/2016     Priority: Medium     Insomnia, unspecified insomnia 03/28/2016     Priority: Medium     Hyperlipidemia LDL goal <160 03/28/2016     Priority: Medium     5.8% 10 yr risk 2016         HTN, goal below 140/90 10/01/2013     Priority: Medium     Mild major depression (H) 08/28/2013     Priority: Medium     S/P TKR (total knee replacement): 6/4/12 06/08/2012     Priority: Medium     Anemia due to blood loss, acute 06/08/2012     Priority: Medium     Physical deconditioning 06/08/2012     Priority: Medium     Oral thrush 06/08/2012     Priority: Medium     Anxiety state 01/14/2004     Priority: Medium     Problem list name updated by automated process. Provider to review       Obesity 01/14/2004     Priority: Medium     Problem list name updated by automated process. Provider to review       Osteoarthritis of ankle and foot 01/14/2004     Priority: Medium       Current Outpatient  Medications   Medication     Alcohol Swabs ([facility] PREP) 70 % PADS     amoxicillin-clavulanate (AUGMENTIN) 875-125 MG tablet     blood glucose (ONETOUCH VERIO IQ) test strip     ibuprofen (ADVIL,MOTRIN) 600 MG tablet     metFORMIN (GLUCOPHAGE-XR) 750 MG 24 hr tablet     semaglutide (OZEMPIC) 1 MG/DOSE pen     ASPIRIN NOT PRESCRIBED (INTENTIONAL)     atorvastatin (LIPITOR) 10 MG tablet     meloxicam (MOBIC) 15 MG tablet     No current facility-administered medications for this visit.      ROS:  As above per HPI    Objective:   /78 (BP Location: Right arm, Patient Position: Chair, Cuff Size: Adult Large)   Pulse 87   Temp 98  F (36.7  C) (Oral)   Wt 92.1 kg (203 lb)   LMP 06/15/2006   SpO2 96%   BMI 33.78 kg/m  , Body mass index is 33.78 kg/m .  Gen:  NAD, well-nourished, sitting in chair comfortably  HEENT: EOMI, sclera anicteric, Head normocephalic, ; nares patent; moist mucous membranes, 1+ tonsils  Neck: trachea midline, no thyromegaly  CV:  Hemodynamically stable, RRR  Pulm:  no increased work of breathing , CTAB, no wheezes/rales/rhonchi   Extrem: no cyanosis, edema or clubbing  Skin: no obvious rashes or abnormalities  Psych: Euthymic, linear thoughts, normal rate of speech    No results found for any visits on 02/24/20.    Assessment & Plan:   Areli Stubbs, 60 year old female who presents with:  Acute sinusitis with symptoms > 10 days  Normal heart/lung exam. Prolonged symptoms of sinus pressure/discharge -- augmentin therapy recommended for 10 days. Nasal steroids and nasal saline rinses also recommended daily. Return in 5 days If no improvement.    - amoxicillin-clavulanate (AUGMENTIN) 875-125 MG tablet  Dispense: 20 tablet; Refill: 0      Patient asked if I would be her PCP , discussed I occasionally see patient's at [place] but do not formally carry a panel.     Miguel A Arenas MD   [place] [facility]    The use of Dragon/[employer] dictation services may have been used to construct  the content in this note; any grammatical or spelling errors are non-intentional. Please contact the author of this note directly if you are in need of any clarification.

## 2020-02-24 NOTE — PATIENT INSTRUCTIONS
Fluticasone (over the counter) nasal steroid spray - use it once a day in each nostril. For 7-10 days      Augmentin (prescription) antibiotic twice a day for 10 days      Optional: nasal saline rinses daily (neti pot or kimani med)       If symptoms not improving in next 5 days please call our office to discuss

## 2020-03-31 ENCOUNTER — DOCUMENTATION ONLY (OUTPATIENT)
Dept: FAMILY MEDICINE | Facility: CLINIC | Age: 60
End: 2020-03-31

## 2020-03-31 NOTE — PROGRESS NOTES
Chart reviewed by Ambulatory Quality and Data team    Please abstract the following data from this visit with this patient into the appropriate field in Epic:    Tests that can be patient reported without a hard copy:        Other Tests found in the patient's chart through Chart Review/Care Everywhere:    Eye exam with ophthalmology on this date: 2/19/19- Eye Exam Negative for Retinopathy    Note to Abstraction: If this section is blank, no results were found via Chart Review/Care Everywhere.

## 2020-04-06 DIAGNOSIS — E11.9 TYPE 2 DIABETES MELLITUS WITHOUT COMPLICATION, WITHOUT LONG-TERM CURRENT USE OF INSULIN (H): Primary | ICD-10-CM

## 2020-04-07 NOTE — TELEPHONE ENCOUNTER
Routing refill request to provider for review/approval because:  Drug not active on patient's medication list  Patient needs to be seen because:  Needs DM erica    Ace Ochoa RN, BSN

## 2020-04-08 ENCOUNTER — VIRTUAL VISIT (OUTPATIENT)
Dept: FAMILY MEDICINE | Facility: CLINIC | Age: 60
End: 2020-04-08
Payer: COMMERCIAL

## 2020-04-08 DIAGNOSIS — E11.9 TYPE 2 DIABETES MELLITUS WITHOUT COMPLICATION, WITHOUT LONG-TERM CURRENT USE OF INSULIN (H): ICD-10-CM

## 2020-04-08 DIAGNOSIS — Z12.11 SPECIAL SCREENING FOR MALIGNANT NEOPLASMS, COLON: Primary | ICD-10-CM

## 2020-04-08 PROCEDURE — 99214 OFFICE O/P EST MOD 30 MIN: CPT | Mod: TEL | Performed by: PHYSICIAN ASSISTANT

## 2020-04-08 RX ORDER — METFORMIN HCL 500 MG
1000 TABLET, EXTENDED RELEASE 24 HR ORAL
Qty: 60 TABLET | Refills: 0 | COMMUNITY
Start: 2020-04-08

## 2020-04-08 RX ORDER — LANCETS 33 GAUGE
EACH MISCELLANEOUS
Qty: 300 EACH | Refills: 6 | Status: SHIPPED | OUTPATIENT
Start: 2020-04-08

## 2020-04-08 RX ORDER — ATORVASTATIN CALCIUM 10 MG/1
10 TABLET, FILM COATED ORAL DAILY
Qty: 90 TABLET | Refills: 3 | Status: SHIPPED | OUTPATIENT
Start: 2020-04-08 | End: 2021-05-11

## 2020-04-08 ASSESSMENT — PATIENT HEALTH QUESTIONNAIRE - PHQ9: SUM OF ALL RESPONSES TO PHQ QUESTIONS 1-9: 4

## 2020-04-08 NOTE — PROGRESS NOTES
"Subjective     Areli Stubbs is a 60 year old female who is being evaluated via a billable telephone visit.      The patient has been notified of following:     \"This telephone visit will be conducted via a call between you and your physician/provider. We have found that certain health care needs can be provided without the need for a physical exam.  This service lets us provide the care you need with a short phone conversation.  If a prescription is necessary we can send it directly to your pharmacy.  If lab work is needed we can place an order for that and you can then stop by our lab to have the test done at a later time.    Telephone visits are billed at different rates depending on your insurance coverage. During this emergency period, for some insurers they may be billed the same as an in-person visit.  Please reach out to your insurance provider with any questions.    If during the course of the call the physician/provider feels a telephone visit is not appropriate, you will not be charged for this service.\"    Patient has given verbal consent for Telephone visit?  Yes    Areli Stubbs complains of   Chief Complaint   Patient presents with     Diabetes       ALLERGIES  Adhesive tape and Sulfa drugs    Diabetes Follow-up    How often are you checking your blood sugar? two times daily- running around about 100  What time of day are you checking your blood sugars (select all that apply)?  Before meals  Have you had any blood sugars above 200?  No  Have you had any blood sugars below 70?  No    What symptoms do you notice when your blood sugar is low?  None    What concerns do you have today about your diabetes? None     Do you have any of these symptoms? (Select all that apply)  No numbness or tingling in feet.  No redness, sores or blisters on feet.  No complaints of excessive thirst.  No reports of blurry vision.  No significant changes to weight.    Have you had a diabetic eye exam in the last 12 months? " Yes-  [facility]    [facility]  Gets some care there and some meds are free for her.  Gets most labs done there as well. Reports that vitamin D was quite low and she is now taking a supplement.    Notes that it is hard to find alcohol pads right now to test her BS.  Wondering about getting the [employer] monitor so she doesn't have to test.      BP Readings from Last 2 Encounters:   02/24/20 116/78   02/16/20 (!) 148/87     Hemoglobin A1C (%)   Date Value   04/25/2019 5.7 (H)   01/09/2019 10.7 (H)     LDL Cholesterol Calculated (mg/dL)   Date Value   01/09/2019 77   03/28/2016 94         How many servings of fruits and vegetables do you eat daily?  4 or more    On average, how many sweetened beverages do you drink each day (Examples: soda, juice, sweet tea, etc.  Do NOT count diet or artificially sweetened beverages)?   0    How many days per week do you exercise enough to make your heart beat faster? 3 or less    How many minutes a day do you exercise enough to make your heart beat faster? 9 or less    How many days per week do you miss taking your medication? 0      Reviewed and updated as needed this visit by Provider         Review of Systems   ROS COMP: Constitutional, HEENT, cardiovascular, pulmonary, gi and gu systems are negative, except as otherwise noted.       Objective   Reported vitals:  [facility] 06/15/2006    alert and no distress  Psych: Alert and oriented times 3; coherent speech, normal   rate and volume, able to articulate logical thoughts, able   to abstract reason, no tangential thoughts, no hallucinations   or delusions  Her affect is bright, talkative.     Diagnostic Test Results:  Labs reviewed in Epic        Assessment/Plan:  1. Type 2 diabetes mellitus without complication, without long-term current use of insulin (H)  Referral to discuss CGM/  She is willing to restart lipitor as she has been off for a little while.  Still taking  metformin and Ozempic.  Last labs are looking very good.  OK to [facility] or get them here this summer.  Continue with all other meds.  - atorvastatin (LIPITOR) 10 MG tablet; Take 1 tablet (10 mg) by mouth daily  Dispense: 90 tablet; Refill: 3  - AMBULATORY ADULT DIABETES EDUCATOR REFERRAL    2. Special screening for malignant neoplasms, colon  Willing to complete at home.  - Fecal colorectal cancer screen (FIT); Future    No follow-ups on file.      Phone call duration:  12 minutes    Ellitot Asekw PA-C         Please abstract the following data from this visit with this patient into the appropriate field in Epic:    Tests that can be patient reported without a hard copy:    Eye exam with ophthalmology on this date: 2/1/2020

## 2020-04-08 NOTE — Clinical Note
Please abstract the following data from this visit with this patient into the appropriate field in Epic:    Tests that can be patient reported without a hard copy:    Mammogram done on this date: 1/1/2020 (approximately), [facility], results were normal.     Other Tests found in the patient's chart through Chart Review/Care Everywhere:    {Abstract Quality List (Optional):212591}    Note to Abstraction: If this section is blank, no results were found via Chart Review/Care Everywhere.

## 2020-05-01 ENCOUNTER — TELEPHONE (OUTPATIENT)
Dept: FAMILY MEDICINE | Facility: CLINIC | Age: 60
End: 2020-05-01

## 2020-05-01 NOTE — TELEPHONE ENCOUNTER
Introduced self as PAL RN via [employer] message. Pt will need appt for DM f/u around 7/8 with MARGA Vega, per chart review - pt has active scheduling request for that time.        Uzma Mandel, RN, BSN

## 2020-08-13 DIAGNOSIS — E11.65 UNCONTROLLED TYPE 2 DIABETES MELLITUS WITH HYPERGLYCEMIA (H): ICD-10-CM

## 2020-08-14 RX ORDER — SEMAGLUTIDE 1.34 MG/ML
INJECTION, SOLUTION SUBCUTANEOUS
Qty: 9 PEN | Refills: 3 | Status: SHIPPED | OUTPATIENT
Start: 2020-08-14

## 2020-08-14 NOTE — TELEPHONE ENCOUNTER
Spoke with pt and she stated that she is [facility] now.  Judith Cespedes MA on 8/14/2020 at 11:40 AM

## 2020-08-14 NOTE — TELEPHONE ENCOUNTER
I really need her [facility] from [facility] to get her chart up to date.  Otherwise I want her to come in for them with us.    I will refill but can we contact her to get this done?  Or the [facility].      Thanks,  Elliott

## 2020-11-22 ENCOUNTER — HEALTH MAINTENANCE LETTER (OUTPATIENT)
Age: 60
End: 2020-11-22

## 2021-02-07 DIAGNOSIS — E11.65 UNCONTROLLED TYPE 2 DIABETES MELLITUS WITH HYPERGLYCEMIA (H): Primary | ICD-10-CM

## 2021-02-07 NOTE — LETTER
February 9, 2021      Areli Stubbs  [address] [address]        Dear Ms. Areli Stubbs,    We recently received a call from your pharmacy requesting a refill of your medications.    We are contacting you today to notify you that you are due for a Diabetic check for further refills.    We have authorized one refill of your medication to allow time for you to schedule your appointment.    This appointment can be in clinic or virtual by either telephone, video.    Please call [phone] to schedule an appointment or if you have MyChart you can schedule with your provider as well.    Taking care of your health is important to us, an ongoing visits with your provider are vital to your care. We look forward to seeing you in the near future.    Thank you for using [facility] for your Medical Needs.          Sincerely,     Elliott Askew PA-C

## 2021-02-09 RX ORDER — BLOOD SUGAR DIAGNOSTIC
STRIP MISCELLANEOUS
Qty: 300 STRIP | Refills: 0 | Status: SHIPPED | OUTPATIENT
Start: 2021-02-09 | End: 2021-05-10

## 2021-02-09 NOTE — TELEPHONE ENCOUNTER
Routing refill request to provider for review/approval because:  Failed protocol for test strips - due for a d/m appt    Will forward to the station, please try to help the pt schedule an appt for d/m f/u.  Thanks!

## 2021-04-04 ENCOUNTER — HEALTH MAINTENANCE LETTER (OUTPATIENT)
Age: 61
End: 2021-04-04

## 2021-05-07 DIAGNOSIS — E11.9 TYPE 2 DIABETES MELLITUS WITHOUT COMPLICATION, WITHOUT LONG-TERM CURRENT USE OF INSULIN (H): ICD-10-CM

## 2021-05-07 RX ORDER — ATORVASTATIN CALCIUM 10 MG/1
TABLET, FILM COATED ORAL
Qty: 90 TABLET | Refills: 3 | OUTPATIENT
Start: 2021-05-07

## 2021-05-07 NOTE — TELEPHONE ENCOUNTER
Called patient and scheduled a med-check. Patient will have enough medication to get to scheduled appt.     Wendie Levy RN on 5/7/2021 at 9:07 AM

## 2021-05-08 DIAGNOSIS — E11.65 UNCONTROLLED TYPE 2 DIABETES MELLITUS WITH HYPERGLYCEMIA (H): ICD-10-CM

## 2021-05-10 RX ORDER — BLOOD SUGAR DIAGNOSTIC
STRIP MISCELLANEOUS
Qty: 300 STRIP | Refills: 3 | Status: SHIPPED | OUTPATIENT
Start: 2021-05-10 | End: 2022-05-05

## 2021-05-10 NOTE — TELEPHONE ENCOUNTER
Patient has an upcoming appointment.    Prescription approved per [facility] Refill Protocol.  Sabine Eden RN

## 2021-05-11 ENCOUNTER — VIRTUAL VISIT (OUTPATIENT)
Dept: FAMILY MEDICINE | Facility: CLINIC | Age: 61
End: 2021-05-11
Payer: COMMERCIAL

## 2021-05-11 DIAGNOSIS — E11.9 TYPE 2 DIABETES MELLITUS WITHOUT COMPLICATION, WITHOUT LONG-TERM CURRENT USE OF INSULIN (H): ICD-10-CM

## 2021-05-11 PROBLEM — Z90.722 HISTORY OF TOTAL HYSTERECTOMY WITH BILATERAL SALPINGO-OOPHORECTOMY (BSO): Status: ACTIVE | Noted: 2021-05-11

## 2021-05-11 PROBLEM — E55.9 VITAMIN D DEFICIENCY: Status: ACTIVE | Noted: 2021-05-11

## 2021-05-11 PROBLEM — J30.2 SEASONAL ALLERGIC RHINITIS: Status: ACTIVE | Noted: 2021-05-11

## 2021-05-11 PROBLEM — H52.10 MYOPIA: Status: ACTIVE | Noted: 2021-05-11

## 2021-05-11 PROBLEM — Z90.79 HISTORY OF TOTAL HYSTERECTOMY WITH BILATERAL SALPINGO-OOPHORECTOMY (BSO): Status: ACTIVE | Noted: 2021-05-11

## 2021-05-11 PROBLEM — Z90.710 HISTORY OF TOTAL HYSTERECTOMY WITH BILATERAL SALPINGO-OOPHORECTOMY (BSO): Status: ACTIVE | Noted: 2021-05-11

## 2021-05-11 PROBLEM — K43.9 HERNIA OF ANTERIOR ABDOMINAL WALL: Status: ACTIVE | Noted: 2021-05-11

## 2021-05-11 PROCEDURE — 99213 OFFICE O/P EST LOW 20 MIN: CPT | Mod: 95 | Performed by: PHYSICIAN ASSISTANT

## 2021-05-11 RX ORDER — SERTRALINE HYDROCHLORIDE 25 MG/1
25 TABLET, FILM COATED ORAL DAILY
COMMUNITY

## 2021-05-11 RX ORDER — ATORVASTATIN CALCIUM 10 MG/1
10 TABLET, FILM COATED ORAL DAILY
Qty: 90 TABLET | Refills: 3 | Status: SHIPPED | OUTPATIENT
Start: 2021-05-11

## 2021-05-11 ASSESSMENT — PATIENT HEALTH QUESTIONNAIRE - PHQ9
5. POOR APPETITE OR OVEREATING: NOT AT ALL
SUM OF ALL RESPONSES TO PHQ QUESTIONS 1-9: 8

## 2021-05-11 ASSESSMENT — ANXIETY QUESTIONNAIRES
IF YOU CHECKED OFF ANY PROBLEMS ON THIS QUESTIONNAIRE, HOW DIFFICULT HAVE THESE PROBLEMS MADE IT FOR YOU TO DO YOUR WORK, TAKE CARE OF THINGS AT HOME, OR GET ALONG WITH OTHER PEOPLE: NOT DIFFICULT AT ALL
1. FEELING NERVOUS, ANXIOUS, OR ON EDGE: NOT AT ALL
5. BEING SO RESTLESS THAT IT IS HARD TO SIT STILL: NOT AT ALL
7. FEELING AFRAID AS IF SOMETHING AWFUL MIGHT HAPPEN: NOT AT ALL
GAD7 TOTAL SCORE: 5
2. NOT BEING ABLE TO STOP OR CONTROL WORRYING: SEVERAL DAYS
6. BECOMING EASILY ANNOYED OR IRRITABLE: SEVERAL DAYS
3. WORRYING TOO MUCH ABOUT DIFFERENT THINGS: NEARLY EVERY DAY

## 2021-05-12 ASSESSMENT — ANXIETY QUESTIONNAIRES: GAD7 TOTAL SCORE: 5

## 2021-07-24 ENCOUNTER — HEALTH MAINTENANCE LETTER (OUTPATIENT)
Age: 61
End: 2021-07-24

## 2021-09-18 ENCOUNTER — HEALTH MAINTENANCE LETTER (OUTPATIENT)
Age: 61
End: 2021-09-18

## 2021-11-13 ENCOUNTER — HEALTH MAINTENANCE LETTER (OUTPATIENT)
Age: 61
End: 2021-11-13

## 2022-01-08 ENCOUNTER — HEALTH MAINTENANCE LETTER (OUTPATIENT)
Age: 62
End: 2022-01-08

## 2022-03-05 ENCOUNTER — HEALTH MAINTENANCE LETTER (OUTPATIENT)
Age: 62
End: 2022-03-05

## 2022-05-04 DIAGNOSIS — E11.65 UNCONTROLLED TYPE 2 DIABETES MELLITUS WITH HYPERGLYCEMIA (H): ICD-10-CM

## 2022-05-05 RX ORDER — BLOOD SUGAR DIAGNOSTIC
STRIP MISCELLANEOUS
Qty: 300 STRIP | Refills: 0 | Status: SHIPPED | OUTPATIENT
Start: 2022-05-05

## 2022-05-05 NOTE — TELEPHONE ENCOUNTER
"Patient reports that she [facility] \"[facility]\" [facility]. Declines need for appt.  -Lela Hansen      "

## 2022-05-05 NOTE — TELEPHONE ENCOUNTER
3 month usha refill approved.     MA/TC: Please assist patient in scheduling office visit.     Gissell Walker RN on 5/5/2022 at 12:47 PM

## 2022-06-25 ENCOUNTER — HEALTH MAINTENANCE LETTER (OUTPATIENT)
Age: 62
End: 2022-06-25

## 2022-08-02 DIAGNOSIS — E11.65 UNCONTROLLED TYPE 2 DIABETES MELLITUS WITH HYPERGLYCEMIA (H): ICD-10-CM

## 2022-08-05 NOTE — TELEPHONE ENCOUNTER
Routing refill request to provider for review/approval because:  Arlen given x1 and patient did not follow up, please advise  Patient needs to be seen because:  due for a 6 month follow up    Gissell Walker RN on 8/5/2022 at 11:17 AM

## 2022-08-08 RX ORDER — BLOOD SUGAR DIAGNOSTIC
STRIP MISCELLANEOUS
Qty: 300 STRIP | Refills: 3 | OUTPATIENT
Start: 2022-08-08

## 2022-08-09 NOTE — TELEPHONE ENCOUNTER
Called the pt to discuss below.  She says unless she is sick, [facility] for [facility] etc.  Will forward to Elliott Askew so she is aware.  Do you want to be taken off as pcp?

## 2022-11-20 ENCOUNTER — HEALTH MAINTENANCE LETTER (OUTPATIENT)
Age: 62
End: 2022-11-20

## 2023-04-15 ENCOUNTER — HEALTH MAINTENANCE LETTER (OUTPATIENT)
Age: 63
End: 2023-04-15

## 2023-09-16 ENCOUNTER — HEALTH MAINTENANCE LETTER (OUTPATIENT)
Age: 63
End: 2023-09-16

## 2024-02-03 ENCOUNTER — HEALTH MAINTENANCE LETTER (OUTPATIENT)
Age: 64
End: 2024-02-03

## 2024-04-13 ENCOUNTER — HEALTH MAINTENANCE LETTER (OUTPATIENT)
Age: 64
End: 2024-04-13

## 2024-06-16 ENCOUNTER — HEALTH MAINTENANCE LETTER (OUTPATIENT)
Age: 64
End: 2024-06-16

## (undated) DEVICE — SYR 03ML SLIP TIP W/O NDL LATEX FREE 309656

## (undated) DEVICE — DRSG GAUZE 4X4" TRAY

## (undated) DEVICE — DRAPE IOBAN INCISE 23X17" 6650EZ

## (undated) DEVICE — TUBING SUCTION 12"X1/4" N612

## (undated) DEVICE — BAG CLEAR TRASH 1.3M 39X33" P4040C

## (undated) DEVICE — TUBING ARTHRO CONMED/LINVATEC PUMP BLUE INFLOW 10K100

## (undated) DEVICE — LINEN HALF SHEET 5512

## (undated) DEVICE — SOL NACL 0.9% IRRIG 3000ML BAG 2B7477

## (undated) DEVICE — GLOVE PROTEXIS POWDER FREE 7.5 ORTHOPEDIC 2D73ET75

## (undated) DEVICE — SUCTION TIP YANKAUER W/O VENT K86

## (undated) DEVICE — SU PROLENE 1 CT-1 30" 8425H

## (undated) DEVICE — BUR SHAVER ARTHRO 6MM OVAL H9102

## (undated) DEVICE — SU ETHIBOND 0 MO-7 CR 8X18" CX41D

## (undated) DEVICE — NDL BLUNT 18GA 1" W/O FILTER 305181

## (undated) DEVICE — DRSG ADAPTIC 3X8" 6113

## (undated) DEVICE — DRAPE ARTHROSCOPY SHOULDER BEACHCHAIR 29369

## (undated) DEVICE — PREP CHLORAPREP 26ML TINTED ORANGE  260815

## (undated) DEVICE — GLOVE PROTEXIS BLUE W/NEU-THERA 8.0  2D73EB80

## (undated) DEVICE — IMM SHOULDER LG 79-84017

## (undated) DEVICE — KIT SUSPENSION SHOULDER 72200195

## (undated) DEVICE — SU VICRYL 3-0 SH 27" UND J416H

## (undated) DEVICE — LINEN ORTHO ACL PACK 5447

## (undated) DEVICE — NDL BLUNT 18GA 1.5" FILTER 305211

## (undated) DEVICE — PACK SHOULDER RIDGES

## (undated) DEVICE — STPL SKIN 35W APPOSE 8886803712

## (undated) DEVICE — Device

## (undated) DEVICE — SUCTION MANIFOLD NEPTUNE 2 SYS 4 PORT 0702-020-000

## (undated) DEVICE — DRSG ABDOMINAL 07 1/2X8" 7197D

## (undated) DEVICE — ESU ARTHROWAND 90DEG RF AMBIENT SUPER TURBOVAC ASHA4250-01

## (undated) DEVICE — LINEN FULL SHEET 5511

## (undated) DEVICE — SU NDL MAYO 1824-6

## (undated) DEVICE — BLADE SHAVER ARTHRO 4.2MM GATOR 9263A

## (undated) DEVICE — NDL 22GA 1.5"

## (undated) RX ORDER — ONDANSETRON 2 MG/ML
INJECTION INTRAMUSCULAR; INTRAVENOUS
Status: DISPENSED
Start: 2017-02-14

## (undated) RX ORDER — LIDOCAINE HYDROCHLORIDE 10 MG/ML
INJECTION, SOLUTION EPIDURAL; INFILTRATION; INTRACAUDAL; PERINEURAL
Status: DISPENSED
Start: 2017-02-14

## (undated) RX ORDER — HYDRALAZINE HYDROCHLORIDE 20 MG/ML
INJECTION INTRAMUSCULAR; INTRAVENOUS
Status: DISPENSED
Start: 2017-02-14

## (undated) RX ORDER — DEXAMETHASONE SODIUM PHOSPHATE 4 MG/ML
INJECTION, SOLUTION INTRA-ARTICULAR; INTRALESIONAL; INTRAMUSCULAR; INTRAVENOUS; SOFT TISSUE
Status: DISPENSED
Start: 2017-02-14

## (undated) RX ORDER — OXYCODONE AND ACETAMINOPHEN 5; 325 MG/1; MG/1
TABLET ORAL
Status: DISPENSED
Start: 2017-02-14

## (undated) RX ORDER — ACETAMINOPHEN 10 MG/ML
INJECTION, SOLUTION INTRAVENOUS
Status: DISPENSED
Start: 2017-02-14

## (undated) RX ORDER — GLYCOPYRROLATE 0.2 MG/ML
INJECTION INTRAMUSCULAR; INTRAVENOUS
Status: DISPENSED
Start: 2017-02-14

## (undated) RX ORDER — ONDANSETRON 4 MG/1
TABLET, ORALLY DISINTEGRATING ORAL
Status: DISPENSED
Start: 2017-02-14

## (undated) RX ORDER — METOCLOPRAMIDE HYDROCHLORIDE 5 MG/ML
INJECTION INTRAMUSCULAR; INTRAVENOUS
Status: DISPENSED
Start: 2017-02-14

## (undated) RX ORDER — LABETALOL HYDROCHLORIDE 5 MG/ML
INJECTION, SOLUTION INTRAVENOUS
Status: DISPENSED
Start: 2017-02-14

## (undated) RX ORDER — CEFAZOLIN SODIUM 2 G/100ML
INJECTION, SOLUTION INTRAVENOUS
Status: DISPENSED
Start: 2017-02-14

## (undated) RX ORDER — FENTANYL CITRATE 50 UG/ML
INJECTION, SOLUTION INTRAMUSCULAR; INTRAVENOUS
Status: DISPENSED
Start: 2017-02-14

## (undated) RX ORDER — LIDOCAINE HYDROCHLORIDE AND EPINEPHRINE 10; 10 MG/ML; UG/ML
INJECTION, SOLUTION INFILTRATION; PERINEURAL
Status: DISPENSED
Start: 2017-02-14

## (undated) RX ORDER — DIMENHYDRINATE 50 MG/ML
INJECTION, SOLUTION INTRAMUSCULAR; INTRAVENOUS
Status: DISPENSED
Start: 2017-02-14

## (undated) RX ORDER — ACETAMINOPHEN 325 MG/1
TABLET ORAL
Status: DISPENSED
Start: 2017-02-14

## (undated) RX ORDER — CEFAZOLIN SODIUM 1 G/3ML
INJECTION, POWDER, FOR SOLUTION INTRAMUSCULAR; INTRAVENOUS
Status: DISPENSED
Start: 2017-02-14

## (undated) RX ORDER — CELECOXIB 200 MG/1
CAPSULE ORAL
Status: DISPENSED
Start: 2017-02-14